# Patient Record
Sex: MALE | Race: WHITE | NOT HISPANIC OR LATINO | Employment: OTHER | URBAN - METROPOLITAN AREA
[De-identification: names, ages, dates, MRNs, and addresses within clinical notes are randomized per-mention and may not be internally consistent; named-entity substitution may affect disease eponyms.]

---

## 2017-04-19 ENCOUNTER — ALLSCRIPTS OFFICE VISIT (OUTPATIENT)
Dept: OTHER | Facility: OTHER | Age: 77
End: 2017-04-19

## 2017-10-19 DIAGNOSIS — N28.89 OTHER SPECIFIED DISORDERS OF KIDNEY AND URETER: ICD-10-CM

## 2017-10-19 DIAGNOSIS — C61 MALIGNANT NEOPLASM OF PROSTATE (HCC): ICD-10-CM

## 2017-10-24 ENCOUNTER — GENERIC CONVERSION - ENCOUNTER (OUTPATIENT)
Dept: OTHER | Facility: OTHER | Age: 77
End: 2017-10-24

## 2017-10-25 ENCOUNTER — GENERIC CONVERSION - ENCOUNTER (OUTPATIENT)
Dept: OTHER | Facility: OTHER | Age: 77
End: 2017-10-25

## 2017-11-16 ENCOUNTER — GENERIC CONVERSION - ENCOUNTER (OUTPATIENT)
Dept: OTHER | Facility: OTHER | Age: 77
End: 2017-11-16

## 2017-12-12 ENCOUNTER — PREPPED CHART (OUTPATIENT)
Dept: URBAN - METROPOLITAN AREA CLINIC 6 | Facility: CLINIC | Age: 77
End: 2017-12-12

## 2018-01-10 NOTE — PROCEDURES
Assessment    1  Prostate cancer (185) (C61)   2  Elevated prostate specific antigen (PSA) (790 93) (R97 2)    Plan  Elevated prostate specific antigen (PSA), Prostate cancer    · (1) TISSUE EXAM; Status:Active - Retrospective By Protocol Authorization; Requested  for:22Jan2016;    Perform:[facility]; Order Comments:CAROLINE akhtar; :[id number]; Last Updated By:Diane Block; 1/22/2016 11:58:11 AM;Ordered;  For:Elevated prostate specific antigen (PSA), Prostate cancer; Ordered By:Sindy Martin; Impression? : C61  Sources(s) : Prostate   · (1) TISSUE EXAM; Status:Active - Retrospective By Protocol Authorization; Requested  for:22Jan2016;    Perform:[facility]; Order Comments:CAROLINE peck; Q:[id number]; Last Updated By:Diane Block; 1/22/2016 11:59:30 AM;Ordered;  For:Elevated prostate specific antigen (PSA), Prostate cancer; Ordered By:Sindy Martin; Impression? : C61  Sources(s) : Prostate   · (1) TISSUE EXAM; Status:Active - Retrospective By Protocol Authorization; Requested  for:22Jan2016;    Perform:[facility]; Order Comments:L pz; TJ:[id number]; Last Updated By:Diane Block; 1/22/2016 12:00:22 PM;Ordered;  For:Elevated prostate specific antigen (PSA), Prostate cancer; Ordered By:Sindy Martin; Impression? : C61  Sources(s) : Prostate   · (1) TISSUE EXAM; Status:Active - Retrospective By Protocol Authorization; Requested  for:22Jan2016;    Perform:[facility]; Order Comments:L cz; UEP:[id number]; Last Updated By:Diane Block; 1/22/2016 12:01:12 PM;Ordered;  For:Elevated prostate specific antigen (PSA), Prostate cancer; Ordered By:Sindy Martin; Impression? : C61  Sources(s) : Prostate    Chief Complaint  Chief Complaint Free Text Note Form: pt presents f/u TRUS BX; cap Screen      Active Problems    1  Elevated prostate specific antigen (PSA) (790 93) (R97 2)   2  Prostate cancer (185) (C61)    Past Medical History    1   History of Diabetes Mellitus (250 00)   2  History of hypertension (V12 59) (Z86 79)    Surgical History    1  History of Needle Biopsy Of Prostate    Family History    1  Family history of Diabetes Mellitus (V18 0)   2  Family history of Heart Disease (V17 49)    Social History    · Being A Social Drinker   · Never A Smoker    Current Meds   1  Aspirin 81 MG Oral Tablet; Therapy: (Recorded:24Apr2015) to Recorded   2  Calcium 600/Vitamin D 600-200 MG-UNIT TABS; Therapy: (Recorded:24Apr2015) to Recorded   3  Ciprodex 0 3-0 1 % Otic Suspension; Therapy: (Myron Mason) to Recorded   4  Crestor 10 MG Oral Tablet Recorded   5  GlipiZIDE XL 2 5 MG Oral Tablet Extended Release 24 Hour Recorded   6  Hytrin 2 MG CAPS; Therapy: (Myron Pee) to Recorded   7  Ramipril 2 5 MG Oral Capsule Recorded   8  Terazosin HCl - 2 MG Oral Capsule Recorded    Allergies    1  Penicillins    Vitals  Vital Signs [Data Includes: Current Encounter]    Recorded: 82RLR4751 01:27PM   Heart Rate 68   Systolic 869   Diastolic 70   Weight 971 lb    BMI Calculated 23 01   BSA Calculated 1 94     Procedure    Prostate Biopsy note: The patient returns to the office today to undergo a transrectal ultrasound-guided biopsy of the prostate secondary to active surveillance Sergio 6 prostate cancer  Risk and benefits of the procedure were discussed  Informed consent was obtained  The patient's prebiopsy preparation was deemed to be adequate  Antibiotics had been taken as prescribed  If appropriate blood thinners had been placed on hold  The patient completed an enema as prescribed  The patient was placed in the lateral decubitus position  Digital rectal examination was performed revealing a 50 gram prostate  Viscous lidocaine jelly was instilled into the rectum  Transrectal ultrasonography was then performed  The prostate measured 55 grams  Central zone calcifications noted  No other abnormality    5 cc of 2% lidocaine were then injected bilaterally between the junction of the base of the prostate and the seminal vesicles  Ultrasound guidance was utilized to place the needle into proper position for the administration of the local anesthetic  A standard 12 core biopsy was then performed  4 cores were taken from the right peripheral zone  2 cores were taken from the right central zone  4 cores were taken from the left peripheral zone  2 cores were taken from the left central zone  Overall the patient tolerated the procedure and there were no complications  Future Appointments    Date/Time Provider Specialty Site   02/04/2016 11:00 AM WM Up  Urology [address] [place] [address]   05/05/2016 10:00 AM WM Up   Urology [address]     Signatures   Electronically signed by : WM Pugh ; Jan 22 2016  2:02PM EST                       (Author)

## 2018-01-13 VITALS
HEIGHT: 71 IN | BODY MASS INDEX: 23.4 KG/M2 | HEART RATE: 76 BPM | DIASTOLIC BLOOD PRESSURE: 80 MMHG | SYSTOLIC BLOOD PRESSURE: 122 MMHG | WEIGHT: 167.13 LBS

## 2018-01-15 NOTE — MISCELLANEOUS
Message   Recorded as Task   Date: 11/16/2017 12:31 PM, Created By: Alessia Allen   Task Name: Care Coordination   Assigned To: [id number]   Regarding Patient: Abdifatah Jalloh, Status: Active   Comment:    Lisette Gastelum - 16 Nov 2017 12:31 PM     TASK CREATED  PT STOPPED IN TO SEE YOU, HE WANTS YOU TO CALL HIM WITH HIS CT RESULTS HE HAD DONE AT [facility] END OF OCT TO BEGINING OF [address] [facility] ON HIS HOME [phone] OR CELL [phone]  Aly Pamelasharon Santiago Maryjo Goode - 12 Nov 2017 2:42 PM     TASK EDITED  CT reviewed with Dr Brigido Brewer, per Dr Brigido Brewer, CT stable, shows the same as previous  Patient aware  Per Dr Brigido Brewer patient is to follow up in 6 months with PSA and CT PTV  Patient to call office to schedule  Active Problems    1  Acute diverticulitis (562 11) (K57 92)   2  Elevated prostate specific antigen (PSA) (790 93) (R97 20)   3  Hematuria (599 70) (R31 9)   4  Prostate cancer (185) (C61)   5  Renal mass (593 9) (N28 89)    Current Meds   1  Aspirin 81 MG TABS; Therapy: (Recorded:10Pdi8079) to Recorded   2  Calcium 600/Vitamin D 600-200 MG-UNIT TABS; Therapy: (Recorded:10Bzt0136) to Recorded   3  Crestor 10 MG Oral Tablet (Rosuvastatin Calcium) Recorded   4  GlipiZIDE XL 2 5 MG Oral Tablet Extended Release 24 Hour Recorded   5  Hytrin 2 MG CAPS (Terazosin HCl); Therapy: (Jeni Matlock) to Recorded   6  Ramipril 2 5 MG Oral Capsule Recorded    Allergies    1   No Known Drug Allergies    Signatures   Electronically signed by : Ian Tineo RN; Nov 16 2017  2:42PM EST                       (Author)

## 2018-01-22 VITALS
HEIGHT: 71 IN | SYSTOLIC BLOOD PRESSURE: 126 MMHG | BODY MASS INDEX: 23.71 KG/M2 | WEIGHT: 169.38 LBS | HEART RATE: 70 BPM | DIASTOLIC BLOOD PRESSURE: 84 MMHG

## 2018-04-02 DIAGNOSIS — N28.89 OTHER SPECIFIED DISORDERS OF KIDNEY AND URETER: ICD-10-CM

## 2018-04-26 ENCOUNTER — TELEPHONE (OUTPATIENT)
Dept: UROLOGY | Facility: CLINIC | Age: 78
End: 2018-04-26

## 2018-04-26 DIAGNOSIS — N28.89 OTHER SPECIFIED DISORDERS OF KIDNEY AND URETER: Primary | ICD-10-CM

## 2018-04-26 NOTE — TELEPHONE ENCOUNTER
Patient of Dr Roz Boxer, seen in [place] office  Received call from [facility] at [address] [facility]  Patient has CT chest abdomen and pelvis with and without contrast scheduled for next week  Patient has not had a recent BMP done  Requesting an order for BMP be faxed to [phone]  Order faxed

## 2018-05-07 ENCOUNTER — OFFICE VISIT (OUTPATIENT)
Dept: UROLOGY | Facility: AMBULATORY SURGERY CENTER | Age: 78
End: 2018-05-07
Payer: MEDICARE

## 2018-05-07 VITALS
DIASTOLIC BLOOD PRESSURE: 82 MMHG | WEIGHT: 167 LBS | HEIGHT: 71 IN | BODY MASS INDEX: 23.38 KG/M2 | HEART RATE: 56 BPM | SYSTOLIC BLOOD PRESSURE: 126 MMHG

## 2018-05-07 DIAGNOSIS — N28.89 RENAL MASS: Primary | ICD-10-CM

## 2018-05-07 DIAGNOSIS — R97.20 ELEVATED PSA: ICD-10-CM

## 2018-05-07 PROCEDURE — 99214 OFFICE O/P EST MOD 30 MIN: CPT | Performed by: UROLOGY

## 2018-05-07 NOTE — LETTER
May 7, 2018     [address], [address] [address]    Patient: Maik Farias   YOB: 1940   Date of Visit: 5/7/2018       Dear Dr Corey Gallagher: Thank you for referring Rosamaria Gomez to me for evaluation  Below are my notes for this consultation  If you have questions, please do not hesitate to call me  I look forward to following your patient along with you  Sincerely,        Nakia Talbert MD        CC: No Recipients  Nakia Talbert MD  5/7/2018  4:37 PM  Sign at close encounter  5/7/2018    Maik Farias  1940  [id number]        Assessment  Solid right renal mass, possibly 2, of the left kidney  Active surveillance prostate cancer    Plan  At this point I recommend MRI to further evaluate the kidney more carefully and determine whether there is risk enough to recommend surgery  He was asked to obtain a disc at the time of his study and bring it so I can evaluate the images  He agrees to do so  Follow-up for further discussion depending on findings  Continue to follow-up PSA periodically for low-grade prostate cancer active surveillance  {kktime:[id number]}    History of Present Illness  Richy Palacios is a 78 y o  male with small renal mass who opted for surveillance, as well as active surveillance prostate cancer  He has mild to moderate urinary symptoms associated with enlarged prostate  This is not terribly bothersome for him  PSA is relatively stable at 4 62 which is within range from the last few years  He had a CT scan done at [facility] system and did not bring a disc  Report indicates 1 stable left renal nodule as well as potentially a second enhancing lesion measuring 2 4 cm  This was not identified from previous reports, and images are not currently available          AUA SYMPTOM SCORE      Most Recent Value   AUA SYMPTOM SCORE   How often have you had a sensation of not emptying your bladder completely after you finished urinating? 0   How often have you had to urinate again less than two hours after you finished urinating? 4   How often have you found you stopped and started again several times when you urinate? 1   How often have you found it difficult to postpone urination? 5   How often have you had a weak urinary stream?  0   How often have you had to push or strain to begin urination? 0   How many times did you most typically get up to urinate from the time you went to bed at night until the time you got up in the morning? 5   Quality of Life: If you were to spend the rest of your life with your urinary condition just the way it is now, how would you feel about that?  3   AUA SYMPTOM SCORE  15          Review of Systems  Review of Systems   Constitutional: Negative  HENT: Negative  Respiratory: Negative  Cardiovascular: Negative  Gastrointestinal: Negative  Genitourinary:        As per HPI   Musculoskeletal: Negative  Skin: Negative  Neurological: Negative  Hematological: Negative  Past Medical History  Past Medical History:   Diagnosis Date    Cancer ([facility] [address] )     PROSTATE RESOLVED    Chronic kidney disease     L MIDPOLE RENAL MASS    Diabetes mellitus (HCC)     Diverticulitis     Elevated cholesterol     Gallstones     History of colon polyps     Hypertension     Stomach pain        Past Social History  Past Surgical History:   Procedure Laterality Date    [place] COLONOSCOPY FLX DX W/COLLJ SPEC WHEN PFRMD N/A 8/19/2016    Procedure: COLONOSCOPY;  Surgeon: Fatou Arriola MD;  [facility]: [facility]; Service: Colorectal    WISDOM TOOTH EXTRACTION         Past Family History  History reviewed  No pertinent family history  Past Social history  Social History     Social History    Marital status: /Civil Union     Spouse name: N/A    Number of children: N/A    Years of education: N/A     Occupational History    Not on file       Social History Main Topics  Smoking status: Former Smoker    Smokeless tobacco: Never Used    Alcohol use Yes      Comment: OCCASION    Drug use: No    Sexual activity: Not on file     Other Topics Concern    Not on file     Social History Narrative    No narrative on file     History   Smoking Status    Former Smoker   Smokeless Tobacco    Never Used       Current Medications  Current Outpatient Prescriptions   Medication Sig Dispense Refill    aspirin 81 MG tablet Take 81 mg by mouth daily   calcium carbonate (OS-HAM) 600 MG tablet Take 600 mg by mouth 2 (two) times a day with meals   Cholecalciferol (VITAMIN D-3 PO) Take 1,000 Units by mouth daily   glipiZIDE-metFORMIN (METAGLIP) 2 5-250 MG per tablet Take 1 tablet by mouth daily   ramipril (ALTACE) 2 5 mg capsule Take 2 5 mg by mouth daily   rosuvastatin (CRESTOR) 10 MG tablet Take 10 mg by mouth daily   terazosin (HYTRIN) 2 mg capsule Take 2 mg by mouth daily at bedtime  No current facility-administered medications for this visit  Allergies  Allergies   Allergen Reactions    Aluminum-Containing Compounds Other (See Comments)     Lips Swelling    Penicillins        Past Medical History, Social History, Family History, medications and allergies were reviewed  Vitals  Vitals:    05/07/18 1354   BP: 126/82   BP Location: Left arm   Patient Position: Sitting   Cuff Size: Adult   Pulse: 56   Weight: 75 8 kg (167 lb)   Height: 5' 11" (1 803 m)       Physical Exam  Physical Exam   Constitutional: He is oriented to person, place, and time  He appears well-developed and well-nourished  Cardiovascular: Normal rate  Pulmonary/Chest: Effort normal    Abdominal: Soft  Genitourinary:   Genitourinary Comments: About 50 g, smooth   Musculoskeletal: Normal range of motion  Neurological: He is alert and oriented to person, place, and time  Skin: Skin is warm, dry and intact  Psychiatric: He has a normal mood and affect     Vitals reviewed          Results  No results found for: PSA  Lab Results   Component Value Date    GLUCOSE 107 06/15/2016    CALCIUM 9 7 06/15/2016     06/15/2016    K 4 0 06/15/2016    CO2 28 06/15/2016     06/15/2016    BUN 24 06/15/2016    CREATININE 0 98 06/15/2016     No results found for: WBC, HGB, HCT, MCV, PLT

## 2018-05-07 NOTE — PROGRESS NOTES
5/7/2018    Rolando Mendoza  1940  [id number]        Assessment  Solid right renal mass, possibly 2, of the left kidney  Active surveillance prostate cancer    Plan  At this point I recommend MRI to further evaluate the kidney more carefully and determine whether there is risk enough to recommend surgery  He was asked to obtain a disc at the time of his study and bring it so I can evaluate the images  He agrees to do so  Follow-up for further discussion depending on findings  Continue to follow-up PSA periodically for low-grade prostate cancer active surveillance  Total visit time was 25 minutes of which over 50% was spent on counseling  History of Present Illness  Patricia Ayala is a 78 y o  male with small renal mass who opted for surveillance, as well as active surveillance prostate cancer  He has mild to moderate urinary symptoms associated with enlarged prostate  This is not terribly bothersome for him  PSA is relatively stable at 4 62 which is within range from the last few years  He had a CT scan done at [facility] ([place], [place]) and did not bring a disc  Report indicates 1 stable left renal nodule as well as potentially a second enhancing lesion measuring 2 4 cm  This was not identified from previous reports, and images are not currently available  AUA SYMPTOM SCORE      Most Recent Value   AUA SYMPTOM SCORE   How often have you had a sensation of not emptying your bladder completely after you finished urinating? 0   How often have you had to urinate again less than two hours after you finished urinating? 4   How often have you found you stopped and started again several times when you urinate? 1   How often have you found it difficult to postpone urination? 5   How often have you had a weak urinary stream?  0   How often have you had to push or strain to begin urination?   0   How many times did you most typically get up to urinate from the time you went to bed at night until the time you got up in the morning? 5   Quality of Life: If you were to spend the rest of your life with your urinary condition just the way it is now, how would you feel about that?  3   AUA SYMPTOM SCORE  15          Review of Systems  Review of Systems   Constitutional: Negative  HENT: Negative  Respiratory: Negative  Cardiovascular: Negative  Gastrointestinal: Negative  Genitourinary:        As per HPI   Musculoskeletal: Negative  Skin: Negative  Neurological: Negative  Hematological: Negative  Past Medical History  Past Medical History:   Diagnosis Date    Cancer ([address] )     PROSTATE RESOLVED    Chronic kidney disease     L MIDPOLE RENAL MASS    Diabetes mellitus (HCC)     Diverticulitis     Elevated cholesterol     Gallstones     History of colon polyps     Hypertension     Stomach pain        Past Social History  Past Surgical History:   Procedure Laterality Date    [place] COLONOSCOPY FLX DX W/COLLJ SPEC WHEN PFRMD N/A 8/19/2016    Procedure: COLONOSCOPY;  Surgeon: Astrid Myers MD;  [facility]: [facility]; Service: Colorectal    WISDOM TOOTH EXTRACTION         Past Family History  History reviewed  No pertinent family history  Past Social history  Social History     Social History    Marital status: /Civil Union     Spouse name: N/A    Number of children: N/A    Years of education: N/A     Occupational History    Not on file  Social History Main Topics    Smoking status: Former Smoker    Smokeless tobacco: Never Used    Alcohol use Yes      Comment: OCCASION    Drug use: No    Sexual activity: Not on file     Other Topics Concern    Not on file     Social History Narrative    No narrative on file     History   Smoking Status    Former Smoker   Smokeless Tobacco    Never Used       Current Medications  Current Outpatient Prescriptions   Medication Sig Dispense Refill    aspirin 81 MG tablet Take 81 mg by mouth daily        calcium carbonate (OS-HAM) 600 MG tablet Take 600 mg by mouth 2 (two) times a day with meals   Cholecalciferol (VITAMIN D-3 PO) Take 1,000 Units by mouth daily   glipiZIDE-metFORMIN (METAGLIP) 2 5-250 MG per tablet Take 1 tablet by mouth daily   ramipril (ALTACE) 2 5 mg capsule Take 2 5 mg by mouth daily   rosuvastatin (CRESTOR) 10 MG tablet Take 10 mg by mouth daily   terazosin (HYTRIN) 2 mg capsule Take 2 mg by mouth daily at bedtime  No current facility-administered medications for this visit  Allergies  Allergies   Allergen Reactions    Aluminum-Containing Compounds Other (See Comments)     Lips Swelling    Penicillins        Past Medical History, Social History, Family History, medications and allergies were reviewed  Vitals  Vitals:    05/07/18 1354   BP: 126/82   BP Location: Left arm   Patient Position: Sitting   Cuff Size: Adult   Pulse: 56   Weight: 75 8 kg (167 lb)   Height: 5' 11" (1 803 m)       Physical Exam  Physical Exam   Constitutional: He is oriented to person, place, and time  He appears well-developed and well-nourished  Cardiovascular: Normal rate  Pulmonary/Chest: Effort normal    Abdominal: Soft  Genitourinary:   Genitourinary Comments: About 50 g, smooth   Musculoskeletal: Normal range of motion  Neurological: He is alert and oriented to person, place, and time  Skin: Skin is warm, dry and intact  Psychiatric: He has a normal mood and affect  Vitals reviewed          Results  No results found for: PSA  Lab Results   Component Value Date    GLUCOSE 107 06/15/2016    CALCIUM 9 7 06/15/2016     06/15/2016    K 4 0 06/15/2016    CO2 28 06/15/2016     06/15/2016    BUN 24 06/15/2016    CREATININE 0 98 06/15/2016     No results found for: WBC, HGB, HCT, MCV, PLT

## 2018-05-07 NOTE — LETTER
May 7, 2018     Jose Bowser, [address] [address]    Patient: Matthew Lynn   YOB: 1940   Date of Visit: 5/7/2018       Dear Dr Eloisa Cueva: Thank you for referring Alondra Alcocer to me for evaluation  Below are my notes for this consultation  If you have questions, please do not hesitate to call me  I look forward to following your patient along with you  Sincerely,        Azul Cleaning MD        CC: No Recipients  Azul Cleaning MD  5/7/2018  4:37 PM  Sign at close encounter  5/7/2018    Matthew Lynn  1940  [id number]        Assessment  Solid right renal mass, possibly 2, of the left kidney  Active surveillance prostate cancer    Plan  At this point I recommend MRI to further evaluate the kidney more carefully and determine whether there is risk enough to recommend surgery  He was asked to obtain a disc at the time of his study and bring it so I can evaluate the images  He agrees to do so  Follow-up for further discussion depending on findings  Continue to follow-up PSA periodically for low-grade prostate cancer active surveillance  Total visit time was 25 minutes of which over 50% was spent on counseling  History of Present Illness  Marielos Low is a 78 y o  male with small renal mass who opted for surveillance, as well as active surveillance prostate cancer  He has mild to moderate urinary symptoms associated with enlarged prostate  This is not terribly bothersome for him  PSA is relatively stable at 4 62 which is within range from the last few years  He had a CT scan done at [facility] and did not bring a disc  Report indicates 1 stable left renal nodule as well as potentially a second enhancing lesion measuring 2 4 cm  This was not identified from previous reports, and images are not currently available          AUA SYMPTOM SCORE      Most Recent Value   AUA SYMPTOM SCORE   How often have you had a sensation of not emptying your bladder completely after you finished urinating? 0   How often have you had to urinate again less than two hours after you finished urinating? 4   How often have you found you stopped and started again several times when you urinate? 1   How often have you found it difficult to postpone urination? 5   How often have you had a weak urinary stream?  0   How often have you had to push or strain to begin urination? 0   How many times did you most typically get up to urinate from the time you went to bed at night until the time you got up in the morning? 5   Quality of Life: If you were to spend the rest of your life with your urinary condition just the way it is now, how would you feel about that?  3   AUA SYMPTOM SCORE  15          Review of Systems  Review of Systems   Constitutional: Negative  HENT: Negative  Respiratory: Negative  Cardiovascular: Negative  Gastrointestinal: Negative  Genitourinary:        As per HPI   Musculoskeletal: Negative  Skin: Negative  Neurological: Negative  Hematological: Negative  Past Medical History  Past Medical History:   Diagnosis Date    Cancer ([facility] [address] )     PROSTATE RESOLVED    Chronic kidney disease     L MIDPOLE RENAL MASS    Diabetes mellitus (HCC)     Diverticulitis     Elevated cholesterol     Gallstones     History of colon polyps     Hypertension     Stomach pain        Past Social History  Past Surgical History:   Procedure Laterality Date    [place] COLONOSCOPY FLX DX W/COLLJ SPEC WHEN PFRMD N/A 8/19/2016    Procedure: COLONOSCOPY;  Surgeon: Amanuel Mcnair MD;  [facility]; Service: Colorectal    WISDOM TOOTH EXTRACTION         Past Family History  History reviewed  No pertinent family history      Past Social history  Social History     Social History    Marital status: /Civil Union     Spouse name: N/A    Number of children: N/A    Years of education: N/A Occupational History    Not on file  Social History Main Topics    Smoking status: Former Smoker    Smokeless tobacco: Never Used    Alcohol use Yes      Comment: OCCASION    Drug use: No    Sexual activity: Not on file     Other Topics Concern    Not on file     Social History Narrative    No narrative on file     History   Smoking Status    Former Smoker   Smokeless Tobacco    Never Used       Current Medications  Current Outpatient Prescriptions   Medication Sig Dispense Refill    aspirin 81 MG tablet Take 81 mg by mouth daily   calcium carbonate (OS-HAM) 600 MG tablet Take 600 mg by mouth 2 (two) times a day with meals   Cholecalciferol (VITAMIN D-3 PO) Take 1,000 Units by mouth daily   glipiZIDE-metFORMIN (METAGLIP) 2 5-250 MG per tablet Take 1 tablet by mouth daily   ramipril (ALTACE) 2 5 mg capsule Take 2 5 mg by mouth daily   rosuvastatin (CRESTOR) 10 MG tablet Take 10 mg by mouth daily   terazosin (HYTRIN) 2 mg capsule Take 2 mg by mouth daily at bedtime  No current facility-administered medications for this visit  Allergies  Allergies   Allergen Reactions    Aluminum-Containing Compounds Other (See Comments)     Lips Swelling    Penicillins        Past Medical History, Social History, Family History, medications and allergies were reviewed  Vitals  Vitals:    05/07/18 1354   BP: 126/82   BP Location: Left arm   Patient Position: Sitting   Cuff Size: Adult   Pulse: 56   Weight: 75 8 kg (167 lb)   Height: 5' 11" (1 803 m)       Physical Exam  Physical Exam   Constitutional: He is oriented to person, place, and time  He appears well-developed and well-nourished  Cardiovascular: Normal rate  Pulmonary/Chest: Effort normal    Abdominal: Soft  Genitourinary:   Genitourinary Comments: About 50 g, smooth   Musculoskeletal: Normal range of motion  Neurological: He is alert and oriented to person, place, and time     Skin: Skin is warm, dry and intact  Psychiatric: He has a normal mood and affect  Vitals reviewed          Results  No results found for: PSA  Lab Results   Component Value Date    GLUCOSE 107 06/15/2016    CALCIUM 9 7 06/15/2016     06/15/2016    K 4 0 06/15/2016    CO2 28 06/15/2016     06/15/2016    BUN 24 06/15/2016    CREATININE 0 98 06/15/2016     No results found for: WBC, HGB, HCT, MCV, PLT

## 2018-05-17 ENCOUNTER — TELEPHONE (OUTPATIENT)
Dept: UROLOGY | Facility: CLINIC | Age: 78
End: 2018-05-17

## 2018-05-17 NOTE — TELEPHONE ENCOUNTER
Can we move his appointment sooner into a discussion spot? He had his MRI done and will get the disc and bring it

## 2018-05-17 NOTE — TELEPHONE ENCOUNTER
Left message on both home and cell numbers to reschedule patient's appointment   Looking to move patient's appointment to next Wednesday 5/23/18 at 11:30

## 2018-05-23 ENCOUNTER — OFFICE VISIT (OUTPATIENT)
Dept: UROLOGY | Facility: AMBULATORY SURGERY CENTER | Age: 78
End: 2018-05-23
Payer: MEDICARE

## 2018-05-23 VITALS
DIASTOLIC BLOOD PRESSURE: 80 MMHG | SYSTOLIC BLOOD PRESSURE: 128 MMHG | HEART RATE: 80 BPM | BODY MASS INDEX: 23.1 KG/M2 | WEIGHT: 165 LBS | HEIGHT: 71 IN

## 2018-05-23 DIAGNOSIS — N28.89 RENAL MASS: ICD-10-CM

## 2018-05-23 DIAGNOSIS — C61 PROSTATE CANCER (HCC): Primary | ICD-10-CM

## 2018-05-23 PROCEDURE — 99214 OFFICE O/P EST MOD 30 MIN: CPT | Performed by: UROLOGY

## 2018-05-23 NOTE — PROGRESS NOTES
5/23/2018    Debra Ellsworth  1940  [id number]        Assessment  Left renal mass   Active surveillance prostate cancer      Plan  I had a long discussion today with the patient and his wife  They are concerned about the CT and MRI findings  We reviewed the images of the studies and also the written radiology results from [facility] everywhere  We discussed the likelihood that this is a solid renal mass consistent with carcinoma at about 90%  We discussed the possibility of observation, percutaneous biopsy, percutaneous cryoablation, and robotic partial nephrectomy to remove the mass  The each of these options was discussed in detail  We discussed the technique, risks, and benefits of each at length  They had numerous questions which I was happy to answer for them  They would like to discuss with the family members and would likely wish to pursue observation  For now he will have an appointment to follow up in 6 months with PSA  If he wishes to continue with observation, I would check another CT chest, abdomen and pelvis in 1 year from now, as there has not been significant change over the past 2 years  History of Present Illness  Michele Duvall is a 78 y o  male with small left renal mass under surveillance for the past 2 years  He has no urinary symptoms  Active surveillance prostate cancer ongoing, with most recent PSA relatively stable at 4 62  It has been stable for greater than 1 year, although prior to that, PSA was in the lower range and stable  AUA SYMPTOM SCORE      Most Recent Value   AUA SYMPTOM SCORE   How often have you had a sensation of not emptying your bladder completely after you finished urinating? 5   How often have you had to urinate again less than two hours after you finished urinating? 4   How often have you found you stopped and started again several times when you urinate? 4   How often have you found it difficult to postpone urination?   5   How often have you had a weak urinary stream?  0   How often have you had to push or strain to begin urination? 0   How many times did you most typically get up to urinate from the time you went to bed at night until the time you got up in the morning? 2   Quality of Life: If you were to spend the rest of your life with your urinary condition just the way it is now, how would you feel about that?  5   AUA SYMPTOM SCORE  20          Review of Systems  Review of Systems   Constitutional: Negative  HENT: Negative  Respiratory: Negative  Cardiovascular: Negative  Gastrointestinal: Negative  Genitourinary:        As per HPI   Musculoskeletal: Negative  Skin: Negative  Neurological: Negative  Hematological: Negative  Past Medical History  Past Medical History:   Diagnosis Date    Cancer ([address] )     PROSTATE RESOLVED    Chronic kidney disease     L MIDPOLE RENAL MASS    Diabetes mellitus (HCC)     Diverticulitis     Elevated cholesterol     Gallstones     History of colon polyps     Hypertension     Stomach pain        Past Social History  Past Surgical History:   Procedure Laterality Date    [place] COLONOSCOPY FLX DX W/COLLJ SPEC WHEN PFRMD N/A 8/19/2016    Procedure: COLONOSCOPY;  Surgeon: Sheridan Berman MD;  [facility]; Service: Colorectal    WISDOM TOOTH EXTRACTION         Past Family History  Family History   Problem Relation Age of Onset    No Known Problems Father     No Known Problems Mother        Past Social history  Social History     Social History    Marital status: /Civil Union     Spouse name: N/A    Number of children: N/A    Years of education: N/A     Occupational History    Not on file       Social History Main Topics    Smoking status: Former Smoker    Smokeless tobacco: Never Used    Alcohol use Yes      Comment: Social    Drug use: No    Sexual activity: Not on file     Other Topics Concern    Not on file     Social History Narrative    No narrative on file     History   Smoking Status    Former Smoker   Smokeless Tobacco    Never Used       Current Medications  Current Outpatient Prescriptions   Medication Sig Dispense Refill    aspirin 81 MG tablet Take 81 mg by mouth daily   calcium carbonate (OS-HAM) 600 MG tablet Take 600 mg by mouth 2 (two) times a day with meals   Cholecalciferol (VITAMIN D-3 PO) Take 1,000 Units by mouth daily   glipiZIDE-metFORMIN (METAGLIP) 2 5-250 MG per tablet Take 1 tablet by mouth daily   ramipril (ALTACE) 2 5 mg capsule Take 2 5 mg by mouth daily   rosuvastatin (CRESTOR) 10 MG tablet Take 10 mg by mouth daily   terazosin (HYTRIN) 2 mg capsule Take 2 mg by mouth daily at bedtime  No current facility-administered medications for this visit  Allergies  Allergies   Allergen Reactions    Aluminum-Containing Compounds Other (See Comments)     Lips Swelling    Penicillins        Past Medical History, Social History, Family History, medications and allergies were reviewed  Vitals  Vitals:    05/23/18 1128   BP: 128/80   BP Location: Left arm   Patient Position: Sitting   Cuff Size: Adult   Pulse: 80   Weight: 74 8 kg (165 lb)   Height: 5' 10 5" (1 791 m)       Physical Exam  Physical Exam   Constitutional: He is oriented to person, place, and time  He appears well-developed and well-nourished  Cardiovascular: Normal rate  Pulmonary/Chest: Effort normal    Abdominal: Soft  Musculoskeletal: Normal range of motion  Neurological: He is alert and oriented to person, place, and time  Skin: Skin is warm, dry and intact  Psychiatric: He has a normal mood and affect  Vitals reviewed          Results  No results found for: PSA  Lab Results   Component Value Date    GLUCOSE 107 06/15/2016    CALCIUM 9 7 06/15/2016     06/15/2016    K 4 0 06/15/2016    CO2 28 06/15/2016     06/15/2016    BUN 24 06/15/2016    CREATININE 0 98 06/15/2016     No results found for: WBC, HGB, HCT, MCV, PLT

## 2018-05-23 NOTE — LETTER
May 23, 2018     Melina Herrera, [address] [facility] [address]    Patient: Marcia Pittman   YOB: 1940   Date of Visit: 5/23/2018       Dear Dr Chanda De: Thank you for referring Claudio Sanchez to me for evaluation  Below are my notes for this consultation  If you have questions, please do not hesitate to call me  I look forward to following your patient along with you  Sincerely,        Joann Galarza MD        CC: No Recipients  Joann Galarza MD  5/23/2018  1:07 PM  Sign at close encounter  5/23/2018    Marcia Pittman  1940  [id number]        Assessment  Left renal mass   Active surveillance prostate cancer      Plan  I had a long discussion today with the patient and his wife  They are concerned about the CT and MRI findings  We reviewed the images of the studies and also the written radiology results from [facility] everywhere  We discussed the likelihood that this is a solid renal mass consistent with carcinoma at about 90%  We discussed the possibility of observation, percutaneous biopsy, percutaneous cryoablation, and robotic partial nephrectomy to remove the mass  The each of these options was discussed in detail  We discussed the technique, risks, and benefits of each at length  They had numerous questions which I was happy to answer for them  They would like to discuss with the family members and would likely wish to pursue observation  For now he will have an appointment to follow up in 6 months with PSA  If he wishes to continue with observation, I would check another CT chest, abdomen and pelvis in 1 year from now, as there has not been significant change over the past 2 years  History of Present Illness  Marcia Montaño is a 78 y o  male with small left renal mass under surveillance for the past 2 years  He has no urinary symptoms    Active surveillance prostate cancer ongoing, with most recent PSA relatively stable at 4 62   It has been stable for greater than 1 year, although prior to that, PSA was in the lower range and stable  AUA SYMPTOM SCORE      Most Recent Value   AUA SYMPTOM SCORE   How often have you had a sensation of not emptying your bladder completely after you finished urinating? 5   How often have you had to urinate again less than two hours after you finished urinating? 4   How often have you found you stopped and started again several times when you urinate? 4   How often have you found it difficult to postpone urination? 5   How often have you had a weak urinary stream?  0   How often have you had to push or strain to begin urination? 0   How many times did you most typically get up to urinate from the time you went to bed at night until the time you got up in the morning? 2   Quality of Life: If you were to spend the rest of your life with your urinary condition just the way it is now, how would you feel about that?  5   AUA SYMPTOM SCORE  20          Review of Systems  Review of Systems   Constitutional: Negative  HENT: Negative  Respiratory: Negative  Cardiovascular: Negative  Gastrointestinal: Negative  Genitourinary:        As per HPI   Musculoskeletal: Negative  Skin: Negative  Neurological: Negative  Hematological: Negative  Past Medical History  Past Medical History:   Diagnosis Date    Cancer ([facility] [address] )     PROSTATE RESOLVED    Chronic kidney disease     L MIDPOLE RENAL MASS    Diabetes mellitus (HCC)     Diverticulitis     Elevated cholesterol     Gallstones     History of colon polyps     Hypertension     Stomach pain        Past Social History  Past Surgical History:   Procedure Laterality Date    [place] COLONOSCOPY FLX DX W/COLLJ SPEC WHEN PFRMD N/A 8/19/2016    Procedure: COLONOSCOPY;  Surgeon: Daniel Teague MD;  [facility];   Service: Colorectal    WISDOM TOOTH EXTRACTION         Past Family History  Family History   Problem Relation Age of Onset    No Known Problems Father     No Known Problems Mother        Past Social history  Social History     Social History    Marital status: /Civil Union     Spouse name: N/A    Number of children: N/A    Years of education: N/A     Occupational History    Not on file  Social History Main Topics    Smoking status: Former Smoker    Smokeless tobacco: Never Used    Alcohol use Yes      Comment: Social    Drug use: No    Sexual activity: Not on file     Other Topics Concern    Not on file     Social History Narrative    No narrative on file     History   Smoking Status    Former Smoker   Smokeless Tobacco    Never Used       Current Medications  Current Outpatient Prescriptions   Medication Sig Dispense Refill    aspirin 81 MG tablet Take 81 mg by mouth daily   calcium carbonate (OS-HAM) 600 MG tablet Take 600 mg by mouth 2 (two) times a day with meals   Cholecalciferol (VITAMIN D-3 PO) Take 1,000 Units by mouth daily   glipiZIDE-metFORMIN (METAGLIP) 2 5-250 MG per tablet Take 1 tablet by mouth daily   ramipril (ALTACE) 2 5 mg capsule Take 2 5 mg by mouth daily   rosuvastatin (CRESTOR) 10 MG tablet Take 10 mg by mouth daily   terazosin (HYTRIN) 2 mg capsule Take 2 mg by mouth daily at bedtime  No current facility-administered medications for this visit  Allergies  Allergies   Allergen Reactions    Aluminum-Containing Compounds Other (See Comments)     Lips Swelling    Penicillins        Past Medical History, Social History, Family History, medications and allergies were reviewed  Vitals  Vitals:    05/23/18 1128   BP: 128/80   BP Location: Left arm   Patient Position: Sitting   Cuff Size: Adult   Pulse: 80   Weight: 74 8 kg (165 lb)   Height: 5' 10 5" (1 791 m)       Physical Exam  Physical Exam   Constitutional: He is oriented to person, place, and time  He appears well-developed and well-nourished  Cardiovascular: Normal rate  Pulmonary/Chest: Effort normal    Abdominal: Soft  Musculoskeletal: Normal range of motion  Neurological: He is alert and oriented to person, place, and time  Skin: Skin is warm, dry and intact  Psychiatric: He has a normal mood and affect  Vitals reviewed          Results  No results found for: PSA  Lab Results   Component Value Date    GLUCOSE 107 06/15/2016    CALCIUM 9 7 06/15/2016     06/15/2016    K 4 0 06/15/2016    CO2 28 06/15/2016     06/15/2016    BUN 24 06/15/2016    CREATININE 0 98 06/15/2016     No results found for: WBC, HGB, HCT, MCV, PLT

## 2018-06-01 LAB — HBA1C MFR BLD HPLC: 7.5 %

## 2018-11-13 ENCOUNTER — OFFICE VISIT (OUTPATIENT)
Dept: UROLOGY | Facility: AMBULATORY SURGERY CENTER | Age: 78
End: 2018-11-13
Payer: MEDICARE

## 2018-11-13 VITALS
DIASTOLIC BLOOD PRESSURE: 82 MMHG | HEART RATE: 84 BPM | WEIGHT: 166.2 LBS | BODY MASS INDEX: 23.27 KG/M2 | HEIGHT: 71 IN | SYSTOLIC BLOOD PRESSURE: 140 MMHG

## 2018-11-13 DIAGNOSIS — N28.89 RENAL MASS: ICD-10-CM

## 2018-11-13 DIAGNOSIS — C61 PROSTATE CANCER (HCC): Primary | ICD-10-CM

## 2018-11-13 PROCEDURE — 99214 OFFICE O/P EST MOD 30 MIN: CPT | Performed by: UROLOGY

## 2018-11-13 NOTE — PROGRESS NOTES
11/13/2018    Netta Holguin  1940  [id number]        Assessment  Active surveillance [address] prostate cancer  Small renal mass, stable    Plan  We discussed all of his issues in detail  He had many questions  Will follow up in 6 months with another PSA as well as metabolic panel and CT scan to evaluate his kidney mass  No evidence of inguinal hernia on my examination  If it remains bothersome, he may seek out a surgical opinion  All questions answered to their satisfaction  Total visit time was 25 minutes of which over 50% was spent on counseling  History of Present Illness  Asa Lopez is a 70 y o  male with history of prostate cancer undergoing active surveillance  He also has history of left renal mass, for which surveillance is also being performed  It has been stable for 2 years  His only complaint is related to some occasional left groin discomfort  He has no urinary symptoms  Recent PSA 4 60, which is stable for him  AUA SYMPTOM SCORE      Most Recent Value   AUA SYMPTOM SCORE   How often have you had a sensation of not emptying your bladder completely after you finished urinating? 1   How often have you had to urinate again less than two hours after you finished urinating? 1   How often have you found you stopped and started again several times when you urinate? 1   How often have you found it difficult to postpone urination? 0   How often have you had a weak urinary stream?  1   How often have you had to push or strain to begin urination? 0   How many times did you most typically get up to urinate from the time you went to bed at night until the time you got up in the morning? 2   Quality of Life: If you were to spend the rest of your life with your urinary condition just the way it is now, how would you feel about that?  2   AUA SYMPTOM SCORE  6          Review of Systems  Review of Systems   Constitutional: Negative  HENT: Negative  Respiratory: Negative  Cardiovascular: Negative  Gastrointestinal: Negative  Genitourinary:        As per HPI   Musculoskeletal: Negative  Skin: Negative  Neurological: Negative  Hematological: Negative  Past Medical History  Past Medical History:   Diagnosis Date    Cancer ([address] )     PROSTATE RESOLVED    Chronic kidney disease     L MIDPOLE RENAL MASS    Diabetes mellitus (HCC)     Diverticulitis     Elevated cholesterol     Gallstones     History of colon polyps     Hypertension     Stomach pain        Past Social History  Past Surgical History:   Procedure Laterality Date    [place] COLONOSCOPY FLX DX W/COLLJ SPEC WHEN PFRMD N/A 8/19/2016    Procedure: COLONOSCOPY;  Surgeon: Mamta Pozo MD;  [facility]; Service: Colorectal    WISDOM TOOTH EXTRACTION         Past Family History  Family History   Problem Relation Age of Onset    No Known Problems Father     No Known Problems Mother        Past Social history  Social History     Social History    Marital status: /Civil Union     Spouse name: N/A    Number of children: N/A    Years of education: N/A     Occupational History    Not on file  Social History Main Topics    Smoking status: Former Smoker    Smokeless tobacco: Never Used    Alcohol use Yes      Comment: Social    Drug use: No    Sexual activity: Not on file     Other Topics Concern    Not on file     Social History Narrative    No narrative on file     History   Smoking Status    Former Smoker   Smokeless Tobacco    Never Used       Current Medications  Current Outpatient Prescriptions   Medication Sig Dispense Refill    aspirin 81 MG tablet Take 81 mg by mouth daily   calcium carbonate (OS-HAM) 600 MG tablet Take 600 mg by mouth 2 (two) times a day with meals   Cholecalciferol (VITAMIN D-3 PO) Take 1,000 Units by mouth daily   glipiZIDE-metFORMIN (METAGLIP) 2 5-250 MG per tablet Take 1 tablet by mouth daily        ramipril (ALTACE) 2 5 mg capsule Take 2 5 mg by mouth daily   rosuvastatin (CRESTOR) 10 MG tablet Take 10 mg by mouth daily   terazosin (HYTRIN) 2 mg capsule Take 2 mg by mouth daily at bedtime  No current facility-administered medications for this visit  Allergies  Allergies   Allergen Reactions    Aluminum-Containing Compounds Other (See Comments)     Lips Swelling    Penicillins        Past Medical History, Social History, Family History, medications and allergies were reviewed  Vitals  Vitals:    11/13/18 1039   BP: 140/82   BP Location: Left arm   Patient Position: Sitting   Cuff Size: Adult   Pulse: 84   Weight: 75 4 kg (166 lb 3 2 oz)   Height: 5' 11" (1 803 m)       Physical Exam  Physical Exam   Constitutional: He is oriented to person, place, and time  He appears well-developed and well-nourished  Cardiovascular: Normal rate  Pulmonary/Chest: Effort normal    Abdominal: Soft  Genitourinary:   Genitourinary Comments: Normal external genitalia, no left inguinal hernia appreciated  Musculoskeletal: Normal range of motion  Neurological: He is alert and oriented to person, place, and time  Skin: Skin is warm, dry and intact  Psychiatric: He has a normal mood and affect  Vitals reviewed          Results  No results found for: PSA  Lab Results   Component Value Date    CALCIUM 9 7 06/15/2016    K 4 0 06/15/2016    CO2 28 06/15/2016     06/15/2016    BUN 24 06/15/2016    CREATININE 0 98 06/15/2016     No results found for: WBC, HGB, HCT, MCV, PLT

## 2018-11-13 NOTE — LETTER
November 13, 2018     Caryn Henning, [address] [address]    Patient: Cherylene Sciara   YOB: 1940   Date of Visit: 11/13/2018       Dear Dr Nurys Urbano: Thank you for referring Francia Hewitt to me for evaluation  Below are my notes for this consultation  If you have questions, please do not hesitate to call me  I look forward to following your patient along with you  Sincerely,        Jeni Bermudez MD        CC: No Recipients  Jeni Bermudez MD  11/13/2018 12:09 PM  Sign at close encounter  11/13/2018    Cherylene Sciara  1940  [id number]        Assessment  Active surveillance [place] 6 prostate cancer  Small renal mass, stable    Plan  We discussed all of his issues in detail  He had many questions  Will follow up in 6 months with another PSA as well as metabolic panel and CT scan to evaluate his kidney mass  No evidence of inguinal hernia on my examination  If it remains bothersome, he may seek out a surgical opinion  All questions answered to their satisfaction  Total visit time was 25 minutes of which over 50% was spent on counseling  History of Present Illness  Reinaldo Rodriguez is a 70 y o  male with history of prostate cancer undergoing active surveillance  He also has history of left renal mass, for which surveillance is also being performed  It has been stable for 2 years  His only complaint is related to some occasional left groin discomfort  He has no urinary symptoms  Recent PSA 4 60, which is stable for him  AUA SYMPTOM SCORE      Most Recent Value   AUA SYMPTOM SCORE   How often have you had a sensation of not emptying your bladder completely after you finished urinating? 1   How often have you had to urinate again less than two hours after you finished urinating? 1   How often have you found you stopped and started again several times when you urinate?   1   How often have you found it difficult to postpone urination? 0   How often have you had a weak urinary stream?  1   How often have you had to push or strain to begin urination? 0   How many times did you most typically get up to urinate from the time you went to bed at night until the time you got up in the morning? 2   Quality of Life: If you were to spend the rest of your life with your urinary condition just the way it is now, how would you feel about that?  2   AUA SYMPTOM SCORE  6          Review of Systems  Review of Systems   Constitutional: Negative  HENT: Negative  Respiratory: Negative  Cardiovascular: Negative  Gastrointestinal: Negative  Genitourinary:        As per HPI   Musculoskeletal: Negative  Skin: Negative  Neurological: Negative  Hematological: Negative  Past Medical History  Past Medical History:   Diagnosis Date    Cancer ([facility] [address] )     PROSTATE RESOLVED    Chronic kidney disease     L MIDPOLE RENAL MASS    Diabetes mellitus (HCC)     Diverticulitis     Elevated cholesterol     Gallstones     History of colon polyps     Hypertension     Stomach pain        Past Social History  Past Surgical History:   Procedure Laterality Date    [place] COLONOSCOPY FLX DX W/COLLJ SPEC WHEN PFRMD N/A 8/19/2016    Procedure: COLONOSCOPY;  Surgeon: Gayle Wild MD;  [facility]; Service: Colorectal    WISDOM TOOTH EXTRACTION         Past Family History  Family History   Problem Relation Age of Onset    No Known Problems Father     No Known Problems Mother        Past Social history  Social History     Social History    Marital status: /Civil Union     Spouse name: N/A    Number of children: N/A    Years of education: N/A     Occupational History    Not on file       Social History Main Topics    Smoking status: Former Smoker    Smokeless tobacco: Never Used    Alcohol use Yes      Comment: Social    Drug use: No    Sexual activity: Not on file     Other Topics Concern    Not on file Social History Narrative    No narrative on file     History   Smoking Status    Former Smoker   Smokeless Tobacco    Never Used       Current Medications  Current Outpatient Prescriptions   Medication Sig Dispense Refill    aspirin 81 MG tablet Take 81 mg by mouth daily   calcium carbonate (OS-HAM) 600 MG tablet Take 600 mg by mouth 2 (two) times a day with meals   Cholecalciferol (VITAMIN D-3 PO) Take 1,000 Units by mouth daily   glipiZIDE-metFORMIN (METAGLIP) 2 5-250 MG per tablet Take 1 tablet by mouth daily   ramipril (ALTACE) 2 5 mg capsule Take 2 5 mg by mouth daily   rosuvastatin (CRESTOR) 10 MG tablet Take 10 mg by mouth daily   terazosin (HYTRIN) 2 mg capsule Take 2 mg by mouth daily at bedtime  No current facility-administered medications for this visit  Allergies  Allergies   Allergen Reactions    Aluminum-Containing Compounds Other (See Comments)     Lips Swelling    Penicillins        Past Medical History, Social History, Family History, medications and allergies were reviewed  Vitals  Vitals:    11/13/18 1039   BP: 140/82   BP Location: Left arm   Patient Position: Sitting   Cuff Size: Adult   Pulse: 84   Weight: 75 4 kg (166 lb 3 2 oz)   Height: 5' 11" (1 803 m)       Physical Exam  Physical Exam   Constitutional: He is oriented to person, place, and time  He appears well-developed and well-nourished  Cardiovascular: Normal rate  Pulmonary/Chest: Effort normal    Abdominal: Soft  Genitourinary:   Genitourinary Comments: Normal external genitalia, no left inguinal hernia appreciated  Musculoskeletal: Normal range of motion  Neurological: He is alert and oriented to person, place, and time  Skin: Skin is warm, dry and intact  Psychiatric: He has a normal mood and affect  Vitals reviewed          Results  No results found for: PSA  Lab Results   Component Value Date    CALCIUM 9 7 06/15/2016    K 4 0 06/15/2016    CO2 28 06/15/2016  06/15/2016    BUN 24 06/15/2016    CREATININE 0 98 06/15/2016     No results found for: WBC, HGB, HCT, MCV, PLT

## 2019-01-09 ENCOUNTER — TELEPHONE (OUTPATIENT)
Dept: UROLOGY | Facility: AMBULATORY SURGERY CENTER | Age: 79
End: 2019-01-09

## 2019-01-09 NOTE — TELEPHONE ENCOUNTER
Patient requested for all of his records to be transferred to [facility]  Due to the large amount, he has agreed to come to the [place] office to pick them up  They are available for pickup @ the

## 2019-02-26 ENCOUNTER — TELEPHONE (OUTPATIENT)
Dept: UROLOGY | Facility: MEDICAL CENTER | Age: 79
End: 2019-02-26

## 2019-02-26 NOTE — TELEPHONE ENCOUNTER
Returned patient's call  Patient prefers to continue care with Dr Miri Danielson  Per office visit note from November, patient will keep appt in May with provider  He will get labs and CT done prior to visit

## 2019-02-26 NOTE — TELEPHONE ENCOUNTER
Patient called stating that he saw another provider for a second opinion, and he wishes to proceed with surgery with Dr Elise Record   He has a followup appointment already scheduled with him for 05/20/19, but wants to know if he can be seen sooner  He can be reached on either his home number or his cell number

## 2019-05-01 ENCOUNTER — TELEPHONE (OUTPATIENT)
Dept: UROLOGY | Facility: AMBULATORY SURGERY CENTER | Age: 79
End: 2019-05-01

## 2019-05-07 LAB
BUN SERPL-MCNC: 22 MG/DL (ref 7–25)
BUN/CREAT SERPL: ABNORMAL (CALC) (ref 6–22)
CALCIUM SERPL-MCNC: 9.1 MG/DL (ref 8.6–10.3)
CHLORIDE SERPL-SCNC: 106 MMOL/L (ref 98–110)
CO2 SERPL-SCNC: 26 MMOL/L (ref 20–32)
CREAT SERPL-MCNC: 0.95 MG/DL (ref 0.7–1.18)
GLUCOSE SERPL-MCNC: 162 MG/DL (ref 65–99)
POTASSIUM SERPL-SCNC: 4.5 MMOL/L (ref 3.5–5.3)
PSA SERPL-MCNC: 3.6 NG/ML
SL AMB EGFR AFRICAN AMERICAN: 90 ML/MIN/1.73M2
SL AMB EGFR NON AFRICAN AMERICAN: 77 ML/MIN/1.73M2
SODIUM SERPL-SCNC: 139 MMOL/L (ref 135–146)

## 2019-05-13 ENCOUNTER — HOSPITAL ENCOUNTER (OUTPATIENT)
Dept: RADIOLOGY | Facility: HOSPITAL | Age: 79
Discharge: HOME | End: 2019-05-13
Attending: UROLOGY
Payer: MEDICARE

## 2019-05-13 DIAGNOSIS — N28.89 RENAL MASS: ICD-10-CM

## 2019-05-13 PROCEDURE — 74178 CT ABD&PLV WO CNTR FLWD CNTR: CPT

## 2019-05-13 RX ADMIN — IOHEXOL 100 ML: 350 INJECTION, SOLUTION INTRAVENOUS at 11:32

## 2019-05-20 ENCOUNTER — TELEPHONE (OUTPATIENT)
Dept: UROLOGY | Facility: AMBULATORY SURGERY CENTER | Age: 79
End: 2019-05-20

## 2019-05-20 ENCOUNTER — OFFICE VISIT (OUTPATIENT)
Dept: UROLOGY | Facility: AMBULATORY SURGERY CENTER | Age: 79
End: 2019-05-20
Payer: MEDICARE

## 2019-05-20 VITALS
BODY MASS INDEX: 22.85 KG/M2 | SYSTOLIC BLOOD PRESSURE: 128 MMHG | HEIGHT: 71 IN | WEIGHT: 163.2 LBS | HEART RATE: 84 BPM | DIASTOLIC BLOOD PRESSURE: 70 MMHG

## 2019-05-20 DIAGNOSIS — N28.89 RENAL MASS: Primary | ICD-10-CM

## 2019-05-20 PROCEDURE — 99214 OFFICE O/P EST MOD 30 MIN: CPT | Performed by: UROLOGY

## 2019-05-20 RX ORDER — BLOOD SUGAR DIAGNOSTIC
STRIP MISCELLANEOUS
Refills: 1 | COMMUNITY
Start: 2019-05-08

## 2019-05-20 RX ORDER — LANCETS
EACH MISCELLANEOUS
Refills: 1 | COMMUNITY
Start: 2019-05-08

## 2019-05-29 ENCOUNTER — TELEPHONE (OUTPATIENT)
Dept: VASCULAR SURGERY | Facility: CLINIC | Age: 79
End: 2019-05-29

## 2019-07-24 ENCOUNTER — TELEPHONE (OUTPATIENT)
Dept: UROLOGY | Facility: AMBULATORY SURGERY CENTER | Age: 79
End: 2019-07-24

## 2019-07-24 NOTE — TELEPHONE ENCOUNTER
Patient has ov with Chantal Coronel and Dr Almas Bermudez is not available to ask him  He stated that he was supposed to see Angel Buchanan and he wanted to know if he should keep the appointment that was scheduled with Loren Arriaza or reschedule with Angel Buchanan

## 2019-07-24 NOTE — TELEPHONE ENCOUNTER
Georgette Keny is a patient of Franc Lee and is seen at [facility]    Patient would like a call back to see if the vascular doctor he is seeing on 7/25/2019 is approved by doctor Franc Lee

## 2019-07-25 ENCOUNTER — OFFICE VISIT (OUTPATIENT)
Dept: VASCULAR SURGERY | Facility: CLINIC | Age: 79
End: 2019-07-25
Payer: MEDICARE

## 2019-07-25 VITALS
RESPIRATION RATE: 16 BRPM | HEIGHT: 71 IN | WEIGHT: 160 LBS | SYSTOLIC BLOOD PRESSURE: 130 MMHG | DIASTOLIC BLOOD PRESSURE: 80 MMHG | BODY MASS INDEX: 22.4 KG/M2 | HEART RATE: 62 BPM

## 2019-07-25 DIAGNOSIS — N28.89 RENAL MASS: ICD-10-CM

## 2019-07-25 PROCEDURE — 99204 OFFICE O/P NEW MOD 45 MIN: CPT | Performed by: RADIOLOGY

## 2019-07-25 NOTE — PROGRESS NOTES
Assessment/Plan:    Renal mass  Mr Emelyn Morrow is a 53-year-old male with grossly stable 17 mm enhancing left renal mass highly suspicious for malignancy  Although the mass is small and has been stable over several years, given significant anxiety, the patient would like to actively intervene on this mass  He is seen in IR clinic with his wife to discuss cryoablation  Given review of his imaging, this mass is quite amenable for ablation  I discussed with him and his wife procedural details, benefits and risks  I also explained that I would biopsy the lesion at the same time  I explained the biggest risk is bleeding for which we would monitor him for 4 hours postprocedure  I explained that most patients go home same day  There was a long discussion, mostly between him and his wife, over the type of sedation  Although the wife really encouraged him to use moderate sedation, he does not feel that he would tolerate the procedure without being fully under with general anesthesia given his severe anxiety  After full discussion, the patient would like to proceed for cryoablation of his left renal mass under general anesthesia  I requested for him to obtain updated blood work  Diagnoses and all orders for this visit:    Renal mass  -     CBC  -     Protime-INR; Future  -     Basic metabolic panel; Future  -     Cancel: IR consult; Future  -     IR cryo ablation; Future          Subjective:      Patient ID: Melvin Sanchez is a 70 y o  male  Patient is new to our practice and was referred by Dr Yan Lei (urology) to discuss options regarding a renal mass  Pt has not had any chemo or radiation therapy  Stefani Galarza is a 53-year-old male accompanied by his wife to interventional radiology clinic referred by Dr Yan Lei of Urology  Patient has been well followed by Dr Yan Lei with active surveillance of grossly stable left renal mass and [place] 6 prostate cancer    Over the last few years, the patient was comfortable with active surveillance  At this time, with discussion with Dr Fe Garza, the patient feels that he would like to have treatment  He is quite anxious at the thought that this mass could represent kidney cancer  Patient is without current complaints excluding significant anxiety about his left renal mass  Patient denies any hematuria or flank pain  The following portions of the patient's history were reviewed and updated as appropriate: allergies, current medications, past family history, past medical history, past social history, past surgical history and problem list     Review of Systems   Constitutional: Negative  HENT: Negative  Eyes: Negative  Respiratory: Negative  Cardiovascular: Negative  Gastrointestinal: Negative  Genitourinary: Positive for frequency and urgency  Musculoskeletal: Negative  Skin: Negative  Allergic/Immunologic: Negative  Neurological: Positive for dizziness  Hematological: Negative  Psychiatric/Behavioral: The patient is nervous/anxious  Objective:    I have reviewed and made appropriate changes to the review of systems input by the medical assistant  Vitals:    07/25/19 1425   BP: 130/80   BP Location: Left arm   Patient Position: Sitting   Pulse: 62   Resp: 16   Weight: 72 6 kg (160 lb)   Height: 5' 10 5" (1 791 m)       Patient Active Problem List   Diagnosis    Renal mass    Prostate cancer [facility])       Past Surgical History:   Procedure Laterality Date    [place] COLONOSCOPY FLX DX W/COLLJ [address] WHEN PFRMD N/A 8/19/2016    Procedure: COLONOSCOPY;  Surgeon: Robbie Rawls MD;  [facility];   Service: Colorectal    WISDOM TOOTH EXTRACTION         Family History   Problem Relation Age of Onset    No Known Problems Father     No Known Problems Mother        Social History     Socioeconomic History    Marital status: /Civil Union     Spouse name: Not on file    Number of children: Not on file    Years of education: Not on file    Highest education level: Not on file   Occupational History    Not on file   Social Needs    Financial resource strain: Not on file    Food insecurity:     Worry: Not on file     Inability: Not on file    Transportation needs:     Medical: Not on file     Non-medical: Not on file   Tobacco Use    Smoking status: Former Smoker    Smokeless tobacco: Never Used   Substance and Sexual Activity    Alcohol use: Yes     Comment: Social    Drug use: No    Sexual activity: Not on file   Lifestyle    Physical activity:     Days per week: Not on file     Minutes per session: Not on file    Stress: Not on file   Relationships    Social connections:     Talks on phone: Not on file     Gets together: Not on file     Attends Jehovah's witness service: Not on file     Active member of club or organization: Not on file     Attends meetings of clubs or organizations: Not on file     Relationship status: Not on file    Intimate partner violence:     Fear of current or ex partner: Not on file     Emotionally abused: Not on file     Physically abused: Not on file     Forced sexual activity: Not on file   Other Topics Concern    Not on file   Social History Narrative    Not on file       Allergies   Allergen Reactions    Aluminum-Containing Compounds Other (See Comments)     Lips Swelling    Penicillins          Current Outpatient Medications:     ACCU-CHEK FASTCLIX LANCETS MISC, , Disp: , Rfl: 1    ACCU-CHEK GUIDE test strip, , Disp: , Rfl: 1    aspirin 81 MG tablet, Take 81 mg by mouth daily  , Disp: , Rfl:     calcium carbonate (OS-HAM) 600 MG tablet, Take 600 mg by mouth 2 (two) times a week , Disp: , Rfl:     Cholecalciferol (VITAMIN D-3 PO), Take 1,000 Units by mouth every other day , Disp: , Rfl:     glipiZIDE-metFORMIN (METAGLIP) 2 5-250 MG per tablet, Take 1 tablet by mouth daily  , Disp: , Rfl:     ramipril (ALTACE) 2 5 mg capsule, Take 2 5 mg by mouth daily  , Disp: , Rfl:     rosuvastatin (CRESTOR) 10 MG tablet, Take 10 mg by mouth 3 (three) times a week , Disp: , Rfl:     terazosin (HYTRIN) 2 mg capsule, Take 2 mg by mouth daily at bedtime  , Disp: , Rfl:        Physical Exam   Constitutional: He is oriented to person, place, and time  He appears well-developed and well-nourished  Eyes: Pupils are equal, round, and reactive to light  EOM are normal    Neck: Normal range of motion  Neck supple  Cardiovascular: Normal rate and regular rhythm  Pulmonary/Chest: Effort normal and breath sounds normal    Abdominal: Soft  Bowel sounds are normal    Musculoskeletal: Normal range of motion  Neurological: He is alert and oriented to person, place, and time  Skin: Skin is warm and dry  Psychiatric: He has a normal mood and affect  His behavior is normal    anxious         Imaging:  I personally reviewed the imaging  CT abdomen/pelvis 5/13/2019:  Stable enhancing left renal mass measuring 17 mm  CT abdomen/pelvis 10/12/2016:  Stable enhancing left renal mass measuring 17 mm  CT abdomen (renal protocol):  14 mm enhancing nodule in left renal midpole      Labs:  5/6/2019:  Creatinine 0 95, sodium 139, potassium 4 5

## 2019-07-25 NOTE — TELEPHONE ENCOUNTER
LMOM that I had spoken with Dr Li Hinojosa and he recommended that the patient keep his ov for today

## 2019-07-26 NOTE — ASSESSMENT & PLAN NOTE
Mr Suri Gardner is a  67-year-old male with grossly stable 17 mm enhancing left renal mass highly suspicious for malignancy  Although the mass is small and has been stable over several years, given significant anxiety, the patient would like to actively intervene on this mass  He is seen in IR clinic with his wife to discuss cryoablation  Given review of his imaging, this mass is quite amenable for ablation  I discussed with him and his wife procedural details, benefits and risks  I also explained that I would biopsy the lesion at the same time  I explained the biggest risk is bleeding for which we would monitor him for 4 hours postprocedure  I explained that most patients go home same day  There was a long discussion, mostly between him and his wife, over the type of sedation  Although the wife really encouraged him to use moderate sedation, he does not feel that he would tolerate the procedure without being fully under with general anesthesia given his severe anxiety  After full discussion, the patient would like to proceed for cryoablation of his left renal mass under general anesthesia  I requested for him to obtain updated blood work

## 2019-08-14 ENCOUNTER — TELEPHONE (OUTPATIENT)
Dept: UROLOGY | Facility: AMBULATORY SURGERY CENTER | Age: 79
End: 2019-08-14

## 2019-08-14 NOTE — TELEPHONE ENCOUNTER
Patient came into the office   He let us know that he is going to have a surgery done with Dr Arielle sandy/ Vascular   Guerline Everett wanted to be informed when he was having it     Surgery will be done   9/9/19

## 2019-08-14 NOTE — TELEPHONE ENCOUNTER
Patient needs follow up with Dr Lilliana Jha, following IR biopsy  Scheduled for 9/20/19 at 11:30 in the [facility]

## 2019-09-04 ENCOUNTER — APPOINTMENT (OUTPATIENT)
Dept: LAB | Facility: CLINIC | Age: 79
End: 2019-09-04
Payer: MEDICARE

## 2019-09-04 DIAGNOSIS — N28.89 RENAL MASS: ICD-10-CM

## 2019-09-04 DIAGNOSIS — C61 PROSTATE CANCER (HCC): ICD-10-CM

## 2019-09-04 LAB
ANION GAP SERPL CALCULATED.3IONS-SCNC: 4 MMOL/L (ref 4–13)
BUN SERPL-MCNC: 25 MG/DL (ref 5–25)
CALCIUM SERPL-MCNC: 9.3 MG/DL (ref 8.3–10.1)
CHLORIDE SERPL-SCNC: 108 MMOL/L (ref 100–108)
CO2 SERPL-SCNC: 27 MMOL/L (ref 21–32)
CREAT SERPL-MCNC: 1.01 MG/DL (ref 0.6–1.3)
ERYTHROCYTE [DISTWIDTH] IN BLOOD BY AUTOMATED COUNT: 13.2 % (ref 11.6–15.1)
GFR SERPL CREATININE-BSD FRML MDRD: 72 ML/MIN/1.73SQ M
GLUCOSE P FAST SERPL-MCNC: 140 MG/DL (ref 65–99)
HCT VFR BLD AUTO: 45 % (ref 36.5–49.3)
HGB BLD-MCNC: 14.7 G/DL (ref 12–17)
INR PPP: 1.03 (ref 0.84–1.19)
MCH RBC QN AUTO: 30.9 PG (ref 26.8–34.3)
MCHC RBC AUTO-ENTMCNC: 32.7 G/DL (ref 31.4–37.4)
MCV RBC AUTO: 95 FL (ref 82–98)
PLATELET # BLD AUTO: 150 THOUSANDS/UL (ref 149–390)
PMV BLD AUTO: 9.4 FL (ref 8.9–12.7)
POTASSIUM SERPL-SCNC: 4.4 MMOL/L (ref 3.5–5.3)
PROTHROMBIN TIME: 13.1 SECONDS (ref 11.6–14.5)
PSA SERPL-MCNC: 3.7 NG/ML (ref 0–4)
RBC # BLD AUTO: 4.75 MILLION/UL (ref 3.88–5.62)
SODIUM SERPL-SCNC: 139 MMOL/L (ref 136–145)
WBC # BLD AUTO: 4.5 THOUSAND/UL (ref 4.31–10.16)

## 2019-09-04 PROCEDURE — 84153 ASSAY OF PSA TOTAL: CPT

## 2019-09-04 PROCEDURE — 80048 BASIC METABOLIC PNL TOTAL CA: CPT

## 2019-09-04 PROCEDURE — 85027 COMPLETE CBC AUTOMATED: CPT | Performed by: RADIOLOGY

## 2019-09-04 PROCEDURE — 36415 COLL VENOUS BLD VENIPUNCTURE: CPT | Performed by: RADIOLOGY

## 2019-09-04 PROCEDURE — 85610 PROTHROMBIN TIME: CPT

## 2019-09-09 ENCOUNTER — ANESTHESIA (OUTPATIENT)
Dept: RADIOLOGY | Facility: HOSPITAL | Age: 79
End: 2019-09-09
Payer: MEDICARE

## 2019-09-09 ENCOUNTER — ANESTHESIA EVENT (OUTPATIENT)
Dept: RADIOLOGY | Facility: HOSPITAL | Age: 79
End: 2019-09-09
Payer: MEDICARE

## 2019-09-09 ENCOUNTER — HOSPITAL ENCOUNTER (OUTPATIENT)
Dept: RADIOLOGY | Facility: HOSPITAL | Age: 79
Discharge: HOME | End: 2019-09-09
Attending: RADIOLOGY | Admitting: RADIOLOGY
Payer: MEDICARE

## 2019-09-09 VITALS
BODY MASS INDEX: 21.42 KG/M2 | TEMPERATURE: 97 F | OXYGEN SATURATION: 94 % | HEART RATE: 63 BPM | WEIGHT: 153 LBS | DIASTOLIC BLOOD PRESSURE: 83 MMHG | HEIGHT: 71 IN | RESPIRATION RATE: 13 BRPM | SYSTOLIC BLOOD PRESSURE: 150 MMHG

## 2019-09-09 DIAGNOSIS — N28.89 RENAL MASS: ICD-10-CM

## 2019-09-09 LAB
ANION GAP SERPL CALCULATED.3IONS-SCNC: 3 MMOL/L (ref 4–13)
ATRIAL RATE: 48 BPM
ATRIAL RATE: 50 BPM
BUN SERPL-MCNC: 21 MG/DL (ref 5–25)
CALCIUM SERPL-MCNC: 8.9 MG/DL (ref 8.3–10.1)
CHLORIDE SERPL-SCNC: 109 MMOL/L (ref 100–108)
CO2 SERPL-SCNC: 27 MMOL/L (ref 21–32)
CREAT SERPL-MCNC: 0.96 MG/DL (ref 0.6–1.3)
ERYTHROCYTE [DISTWIDTH] IN BLOOD BY AUTOMATED COUNT: 13 % (ref 11.6–15.1)
GFR SERPL CREATININE-BSD FRML MDRD: 76 ML/MIN/1.73SQ M
GLUCOSE P FAST SERPL-MCNC: 160 MG/DL (ref 65–99)
GLUCOSE SERPL-MCNC: 158 MG/DL (ref 65–140)
GLUCOSE SERPL-MCNC: 160 MG/DL (ref 65–140)
GLUCOSE SERPL-MCNC: 179 MG/DL (ref 65–140)
GLUCOSE SERPL-MCNC: 193 MG/DL (ref 65–140)
HCT VFR BLD AUTO: 46.5 % (ref 36.5–49.3)
HGB BLD-MCNC: 15.5 G/DL (ref 12–17)
INR PPP: 1.04 (ref 0.84–1.19)
MCH RBC QN AUTO: 31.7 PG (ref 26.8–34.3)
MCHC RBC AUTO-ENTMCNC: 33.3 G/DL (ref 31.4–37.4)
MCV RBC AUTO: 95 FL (ref 82–98)
P AXIS: 38 DEGREES
P AXIS: 41 DEGREES
PLATELET # BLD AUTO: 151 THOUSANDS/UL (ref 149–390)
PMV BLD AUTO: 9.4 FL (ref 8.9–12.7)
POTASSIUM SERPL-SCNC: 4.2 MMOL/L (ref 3.5–5.3)
PR INTERVAL: 146 MS
PR INTERVAL: 152 MS
PROTHROMBIN TIME: 13.2 SECONDS (ref 11.6–14.5)
QRS AXIS: 30 DEGREES
QRS AXIS: 31 DEGREES
QRSD INTERVAL: 80 MS
QRSD INTERVAL: 88 MS
QT INTERVAL: 434 MS
QT INTERVAL: 452 MS
QTC INTERVAL: 395 MS
QTC INTERVAL: 403 MS
RBC # BLD AUTO: 4.89 MILLION/UL (ref 3.88–5.62)
SODIUM SERPL-SCNC: 139 MMOL/L (ref 136–145)
T WAVE AXIS: 25 DEGREES
T WAVE AXIS: 36 DEGREES
VENTRICULAR RATE: 48 BPM
VENTRICULAR RATE: 50 BPM
WBC # BLD AUTO: 4.3 THOUSAND/UL (ref 4.31–10.16)

## 2019-09-09 PROCEDURE — C2618 PROBE/NEEDLE, CRYO: HCPCS

## 2019-09-09 PROCEDURE — 85027 COMPLETE CBC AUTOMATED: CPT | Performed by: RADIOLOGY

## 2019-09-09 PROCEDURE — 93010 ELECTROCARDIOGRAM REPORT: CPT | Performed by: INTERNAL MEDICINE

## 2019-09-09 PROCEDURE — 50200 RENAL BIOPSY PERQ: CPT | Performed by: RADIOLOGY

## 2019-09-09 PROCEDURE — 50593 PERC CRYO ABLATE RENAL TUM: CPT

## 2019-09-09 PROCEDURE — 88305 TISSUE EXAM BY PATHOLOGIST: CPT | Performed by: PATHOLOGY

## 2019-09-09 PROCEDURE — 88342 IMHCHEM/IMCYTCHM 1ST ANTB: CPT | Performed by: PATHOLOGY

## 2019-09-09 PROCEDURE — 93005 ELECTROCARDIOGRAM TRACING: CPT

## 2019-09-09 PROCEDURE — 88341 IMHCHEM/IMCYTCHM EA ADD ANTB: CPT | Performed by: PATHOLOGY

## 2019-09-09 PROCEDURE — 50593 PERC CRYO ABLATE RENAL TUM: CPT | Performed by: RADIOLOGY

## 2019-09-09 PROCEDURE — 77013 CT GUIDE FOR TISSUE ABLATION: CPT

## 2019-09-09 PROCEDURE — 82948 REAGENT STRIP/BLOOD GLUCOSE: CPT

## 2019-09-09 PROCEDURE — 85610 PROTHROMBIN TIME: CPT | Performed by: RADIOLOGY

## 2019-09-09 PROCEDURE — 80048 BASIC METABOLIC PNL TOTAL CA: CPT | Performed by: RADIOLOGY

## 2019-09-09 PROCEDURE — 50200 RENAL BIOPSY PERQ: CPT

## 2019-09-09 PROCEDURE — 77013 CT GUIDE FOR TISSUE ABLATION: CPT | Performed by: RADIOLOGY

## 2019-09-09 RX ORDER — PROPOFOL 10 MG/ML
INJECTION, EMULSION INTRAVENOUS AS NEEDED
Status: DISCONTINUED | OUTPATIENT
Start: 2019-09-09 | End: 2019-09-09 | Stop reason: SURG

## 2019-09-09 RX ORDER — FENTANYL CITRATE 50 UG/ML
INJECTION, SOLUTION INTRAMUSCULAR; INTRAVENOUS AS NEEDED
Status: DISCONTINUED | OUTPATIENT
Start: 2019-09-09 | End: 2019-09-09 | Stop reason: SURG

## 2019-09-09 RX ORDER — SUCCINYLCHOLINE/SOD CL,ISO/PF 100 MG/5ML
SYRINGE (ML) INTRAVENOUS AS NEEDED
Status: DISCONTINUED | OUTPATIENT
Start: 2019-09-09 | End: 2019-09-09 | Stop reason: SURG

## 2019-09-09 RX ORDER — LIDOCAINE HYDROCHLORIDE 10 MG/ML
INJECTION, SOLUTION INFILTRATION; PERINEURAL AS NEEDED
Status: DISCONTINUED | OUTPATIENT
Start: 2019-09-09 | End: 2019-09-09 | Stop reason: SURG

## 2019-09-09 RX ORDER — SODIUM CHLORIDE 9 MG/ML
175 INJECTION, SOLUTION INTRAVENOUS CONTINUOUS
Status: DISCONTINUED | OUTPATIENT
Start: 2019-09-09 | End: 2019-09-09 | Stop reason: HOSPADM

## 2019-09-09 RX ORDER — LEVOFLOXACIN 5 MG/ML
500 INJECTION, SOLUTION INTRAVENOUS ONCE
Status: COMPLETED | OUTPATIENT
Start: 2019-09-09 | End: 2019-09-09

## 2019-09-09 RX ORDER — DEXAMETHASONE SODIUM PHOSPHATE 10 MG/ML
INJECTION, SOLUTION INTRAMUSCULAR; INTRAVENOUS AS NEEDED
Status: DISCONTINUED | OUTPATIENT
Start: 2019-09-09 | End: 2019-09-09 | Stop reason: SURG

## 2019-09-09 RX ORDER — ONDANSETRON 2 MG/ML
4 INJECTION INTRAMUSCULAR; INTRAVENOUS EVERY 6 HOURS PRN
Status: DISCONTINUED | OUTPATIENT
Start: 2019-09-09 | End: 2019-09-09 | Stop reason: HOSPADM

## 2019-09-09 RX ORDER — EPHEDRINE SULFATE 50 MG/ML
INJECTION INTRAVENOUS AS NEEDED
Status: DISCONTINUED | OUTPATIENT
Start: 2019-09-09 | End: 2019-09-09 | Stop reason: SURG

## 2019-09-09 RX ORDER — OXYCODONE HYDROCHLORIDE 5 MG/1
5 TABLET ORAL EVERY 4 HOURS PRN
Status: DISCONTINUED | OUTPATIENT
Start: 2019-09-09 | End: 2019-09-09 | Stop reason: HOSPADM

## 2019-09-09 RX ORDER — FENTANYL CITRATE/PF 50 MCG/ML
25 SYRINGE (ML) INJECTION
Status: DISCONTINUED | OUTPATIENT
Start: 2019-09-09 | End: 2019-09-09 | Stop reason: HOSPADM

## 2019-09-09 RX ORDER — ONDANSETRON 2 MG/ML
INJECTION INTRAMUSCULAR; INTRAVENOUS AS NEEDED
Status: DISCONTINUED | OUTPATIENT
Start: 2019-09-09 | End: 2019-09-09 | Stop reason: SURG

## 2019-09-09 RX ORDER — SODIUM CHLORIDE 9 MG/ML
75 INJECTION, SOLUTION INTRAVENOUS CONTINUOUS
Status: DISCONTINUED | OUTPATIENT
Start: 2019-09-09 | End: 2019-09-09 | Stop reason: HOSPADM

## 2019-09-09 RX ORDER — ONDANSETRON 2 MG/ML
4 INJECTION INTRAMUSCULAR; INTRAVENOUS ONCE AS NEEDED
Status: DISCONTINUED | OUTPATIENT
Start: 2019-09-09 | End: 2019-09-09 | Stop reason: HOSPADM

## 2019-09-09 RX ADMIN — SODIUM CHLORIDE 75 ML/HR: 0.9 INJECTION, SOLUTION INTRAVENOUS at 10:29

## 2019-09-09 RX ADMIN — PROPOFOL 150 MG: 10 INJECTION, EMULSION INTRAVENOUS at 11:47

## 2019-09-09 RX ADMIN — EPHEDRINE SULFATE 10 MG: 50 INJECTION, SOLUTION INTRAVENOUS at 13:19

## 2019-09-09 RX ADMIN — DEXAMETHASONE SODIUM PHOSPHATE 5 MG: 10 INJECTION, SOLUTION INTRAMUSCULAR; INTRAVENOUS at 12:25

## 2019-09-09 RX ADMIN — Medication 100 MG: at 11:47

## 2019-09-09 RX ADMIN — FENTANYL CITRATE 50 MCG: 50 INJECTION, SOLUTION INTRAMUSCULAR; INTRAVENOUS at 12:15

## 2019-09-09 RX ADMIN — PHENYLEPHRINE HYDROCHLORIDE 100 MCG: 10 INJECTION INTRAVENOUS at 12:07

## 2019-09-09 RX ADMIN — EPHEDRINE SULFATE 10 MG: 50 INJECTION, SOLUTION INTRAVENOUS at 12:50

## 2019-09-09 RX ADMIN — ONDANSETRON 4 MG: 2 INJECTION INTRAMUSCULAR; INTRAVENOUS at 12:25

## 2019-09-09 RX ADMIN — PHENYLEPHRINE HYDROCHLORIDE 100 MCG: 10 INJECTION INTRAVENOUS at 12:24

## 2019-09-09 RX ADMIN — FENTANYL CITRATE 50 MCG: 50 INJECTION, SOLUTION INTRAMUSCULAR; INTRAVENOUS at 11:47

## 2019-09-09 RX ADMIN — EPHEDRINE SULFATE 10 MG: 50 INJECTION, SOLUTION INTRAVENOUS at 13:22

## 2019-09-09 RX ADMIN — LEVOFLOXACIN 500 MG: 5 INJECTION, SOLUTION INTRAVENOUS at 11:59

## 2019-09-09 RX ADMIN — LIDOCAINE HYDROCHLORIDE 50 MG: 10 INJECTION, SOLUTION INFILTRATION; PERINEURAL at 11:47

## 2019-09-09 NOTE — ANESTHESIA POSTPROCEDURE EVALUATION
Post-Op Assessment Note    CV Status:  Stable    Pain management: adequate     Mental Status:  Alert and awake   Hydration Status:  Euvolemic   PONV Controlled:  Controlled   Airway Patency:  Patent   Post Op Vitals Reviewed: Yes      Staff: CRNA           BP  170/85   Temp   97   Pulse  64   Resp   18   SpO2 98

## 2019-09-09 NOTE — BRIEF OP NOTE (RAD/CATH)
Left renal mass biopsy and cryoablation procedure note    PATIENT NAME: Zheng Anna  : 1940  MRN: [id number]     Pre-op Diagnosis:   1  Renal mass      Post-op Diagnosis:   1  Renal mass        Surgeon:   Baron Jose DO  Assistants:     No qualified resident was available  Estimated Blood Loss:  None  Findings:  Left renal mass, adequate ablation zone via CT confirmation  Stable small perinephric hematoma  Specimens:  Left renal mass  Complications:  Stable small perinephric hematoma      Anesthesia: General    Baron Jose DO     Date: 2019  Time: 1:46 PM

## 2019-09-09 NOTE — SEDATION DOCUMENTATION
IR Procedure Bedrest Start Time is 1342 x4 hrs and NPO for the first 2 hrs per MD Guzmán  PT will be transferred to PACU  Report given to [facility] LINDA Cruz

## 2019-09-09 NOTE — ANESTHESIA PREPROCEDURE EVALUATION
Review of Systems/Medical History  Patient summary reviewed  Chart reviewed  No history of anesthetic complications     Cardiovascular  EKG reviewed, Exercise tolerance (METS): >4,  Hyperlipidemia, Hypertension ,    Pulmonary  Negative pulmonary ROS        GI/Hepatic  Negative GI/hepatic ROS          Genitourinary malignancy , Prostatic disorder, history of prostate cancer       Endo/Other  Diabetes well controlled type 2 Diet controlled,      GYN       Hematology  Negative hematology ROS      Musculoskeletal       Neurology  Negative neurology ROS      Psychology   Negative psychology ROS          Patient is a 69 y/o man with PMH of HTn, HLD, DM, Prostate CA and kidney lesion for ablation today  METS > 4  Physical Exam    Airway    Mallampati score: II  TM Distance: >3 FB  Neck ROM: full     Dental   No notable dental hx     Cardiovascular  Rhythm: regular, Rate: normal, Cardiovascular exam normal    Pulmonary  Pulmonary exam normal Breath sounds clear to auscultation,     Other Findings        Anesthesia Plan  ASA Score- 2     Anesthesia Type- general with ASA Monitors  Additional Monitors:   Airway Plan: ETT  Plan Factors- Patient instructed to abstain from smoking on day of procedure  Patient did not smoke on day of surgery  Induction- intravenous  Postoperative Plan-   Planned trial extubation    Informed Consent- Anesthetic plan and risks discussed with patient  I personally reviewed this patient with the CRNA  Discussed and agreed on the Anesthesia Plan with the CRNA  Jacqueline Pérez

## 2019-09-09 NOTE — DISCHARGE INSTRUCTIONS
Kidney Tumor Ablation     WHAT YOU NEED TO KNOW:                                                                   Kidney tumor ablation is a procedure to destroy cancer cells in your kidney without removing them  Using image guidance, a probe is inserted into the tumor  Ablations can be done using high energy radio waves (RFA)  Or using microwave energy  Heat destroys the abnormal tissue  A cyroablation destroys abnormal tissue by using cold temperatures  DISCHARGE INSTRUCTIONS:         You may resume your normal diet and medications  Small sips of flat soda will help with nausea  Follow up with your healthcare provider as directed: Write down your questions so you remember to ask them during your visits  Rest as needed: Slowly start to do more each day  Return to your daily activities as directed by your healthcare provider  Contact your healthcare provider if:  Emeli [place] and [place]  Patients Contact Interventional  Radiology at [phone] PATIENTS: Contact Interventional Radiology at [phone])      [place] PATIENTS: Contact Interventional Radiology at [phone]) if any of the following occur: You have severe pain that does not get better with medicine  Difficulty breathing, nausea or vomiting  Chills or fever above 101 degrees F  Develop any redness, swelling, heat, unusual drainage, heavy bruising or                          bleeding from the probe sites  You continue to have pain a week after your procedure  The above information is an  only  It is not intended as medical advice for individual conditions or treatments   Talk to your doctor, nurse or pharmacist before following any medical regimen to see if it is safe and effective for you        Percutaneous Kidney Biopsy   WHAT YOU NEED TO KNOW:   A percutaneous kidney biopsy is a procedure to remove a small sample of kidney tissue  It may also be done to check for kidney disease or cancer  DISCHARGE INSTRUCTIONS:   Follow up with your healthcare provider as directed:  Write down your questions so you remember to ask them during your visits  Wound care: The Band-Aid may be removed in 24 hours  For more information:   · National Kidney and Urologic Diseases Information Clearinghouse  [address] , [place] [address]  Phone: 7- 150 - 230-3708  Web Address: http://kidney  niddk nih gov/   Care after your procedure:    1  Limit your activities for 36 hours after your biopsy  2  No driving day of biopsy  3  Return to your normal diet  Flat sips of flat soda helps with mild nausea  4  Remove band-aid or dressing 24 hours after procedure  Contact Interventional Radiology at [phone]    Marilyn PATIENTS: Contact Interventional Radiology at [phone]) Adeola Barth PATIENTS: Contact Interventional Radiology at [phone]) if:    1  Difficulty breathing, nausea or vomiting  2  You feel weak or dizzy  3  Chills or fever above 101 degrees F  You have persistent nausea or vomiting    4  You have severe pain in your abdomen or where You feel weak or dizzy  your           procedure was done  5  You have blood in your urine  You urinate small amounts or not at all  4  Develop any redness, swelling, heat, unusual drainage, heavy bruising or bleeding     from biopsy site

## 2019-09-11 ENCOUNTER — TELEPHONE (OUTPATIENT)
Dept: RADIOLOGY | Facility: HOSPITAL | Age: 79
End: 2019-09-11

## 2019-09-11 ENCOUNTER — TELEPHONE (OUTPATIENT)
Dept: UROLOGY | Facility: AMBULATORY SURGERY CENTER | Age: 79
End: 2019-09-11

## 2019-09-11 NOTE — TELEPHONE ENCOUNTER
Follow-up phone call status post left renal mass cryoablation 2 days ago  Patient is overall doing well  Patient states that he has occasional dizziness but otherwise does not feel lightheaded  Denies chest pain or shortness of breath  Patient does complain of some stomach pain  There is mild bruising at the access site on the left flank  I requested for him to go to the emergency room if the dizziness worsens, feels lightheaded, experiences chest pain or shortness of breath   Regarding his stomach pain, it is doubtful that the cryoablation procedure would cause any issues with his stomach pain  I recommended for him to follow-up with his primary care physician  I gave a preliminary result of his biopsy to him over the phone  I explained that he will see Dr Frankey Gulling next week and that he will explain thoroughly the biopsy result as well as the plan going forward

## 2019-09-11 NOTE — TELEPHONE ENCOUNTER
Patient managed by Dr Yumiko Xavier in the [place] office  Patient had a kidney mass and had a biopsy, the report will be in [facility] shortly

## 2019-09-20 ENCOUNTER — OFFICE VISIT (OUTPATIENT)
Dept: UROLOGY | Facility: AMBULATORY SURGERY CENTER | Age: 79
End: 2019-09-20
Payer: MEDICARE

## 2019-09-20 VITALS
HEART RATE: 72 BPM | WEIGHT: 153 LBS | DIASTOLIC BLOOD PRESSURE: 82 MMHG | HEIGHT: 70 IN | SYSTOLIC BLOOD PRESSURE: 138 MMHG | BODY MASS INDEX: 21.9 KG/M2

## 2019-09-20 DIAGNOSIS — C64.2 MALIGNANT NEOPLASM OF LEFT KIDNEY (HCC): Primary | ICD-10-CM

## 2019-09-20 PROCEDURE — 99214 OFFICE O/P EST MOD 30 MIN: CPT | Performed by: UROLOGY

## 2019-09-20 NOTE — PROGRESS NOTES
9/20/2019    Dg Avila  1940  [id number]        Assessment  Renal cell carcinoma status post percutaneous cryoablation 9/09/2019  Surveillance Sergio 6 prostate cancer    Plan  He did well status post percutaneous cryotherapy  Pathology revealed renal cell carcinoma, low-grade  Discussed pathology with the patient and his wife  Recommend follow-up CT scan in the next 3 to 4 months to evaluate  Will then follow for total of 5 years periodically  Continue with annual PSA at his request for prostate cancer surveillance  All questions answered to his satisfaction  History of Present Illness  Ale Bearden is a 70 y o  male recently status post percutaneous biopsy and cryoablation of small left renal mass  He tolerated this very well without complaint  He did have some anesthesia complication and felt weak and lightheaded after the procedure  It took about 3 days until his symptoms resolved  He is doing well now with no complaints  PSA recently 3 7  He has declined follow-up prostate biopsy for his [place] 6 prostate cancer  He would like to follow with annual PSA  Review of Systems  Review of Systems   Constitutional: Negative  HENT: Negative  Respiratory: Negative  Cardiovascular: Negative  Gastrointestinal: Negative  Genitourinary:        As per HPI   Musculoskeletal: Negative  Skin: Negative  Neurological: Negative  Hematological: Negative            Past Medical History  Past Medical History:   Diagnosis Date    Cancer ([address] )     PROSTATE RESOLVED    Chronic kidney disease     L MIDPOLE RENAL MASS    Diabetes mellitus (HCC)     Diverticulitis     Elevated cholesterol     Gallstones     History of colon polyps     Hypertension     Stomach pain        Past Social History  Past Surgical History:   Procedure Laterality Date    MS COLONOSCOPY FLX DX W/COLLJ SPEC WHEN PFRMD N/A 8/19/2016    Procedure: COLONOSCOPY;  Surgeon: Louise Adams MD; Location:  GI LAB; Service: Colorectal    WISDOM TOOTH EXTRACTION         Past Family History  Family History   Problem Relation Age of Onset    No Known Problems Father     No Known Problems Mother        Past Social history  Social History     Socioeconomic History    Marital status: /Civil Union     Spouse name: Not on file    Number of children: Not on file    Years of education: Not on file    Highest education level: Not on file   Occupational History    Not on file   Social Needs    Financial resource strain: Not on file    Food insecurity:     Worry: Not on file     Inability: Not on file    Transportation needs:     Medical: Not on file     Non-medical: Not on file   Tobacco Use    Smoking status: Former Smoker    Smokeless tobacco: Never Used   Substance and Sexual Activity    Alcohol use: Yes     Comment: Social    Drug use: No    Sexual activity: Not on file   Lifestyle    Physical activity:     Days per week: Not on file     Minutes per session: Not on file    Stress: Not on file   Relationships    Social connections:     Talks on phone: Not on file     Gets together: Not on file     Attends [employer] service: Not on file     Active member of club or organization: Not on file     Attends meetings of clubs or organizations: Not on file     Relationship status: Not on file    Intimate partner violence:     Fear of current or ex partner: Not on file     Emotionally abused: Not on file     Physically abused: Not on file     Forced sexual activity: Not on file   Other Topics Concern    Not on file   Social History Narrative    Not on file     Social History     Tobacco Use   Smoking Status Former Smoker   Smokeless Tobacco Never Used       Current Medications  Current Outpatient Medications   Medication Sig Dispense Refill    ACCU-CHEK FASTCLIX LANCETS MISC   1    ACCU-CHEK GUIDE test strip   1    aspirin 81 MG tablet Take 81 mg by mouth daily        calcium carbonate (OS-HAM) 600 MG tablet Take 600 mg by mouth 2 (two) times a week       Cholecalciferol (VITAMIN D-3 PO) Take 1,000 Units by mouth every other day       glipiZIDE-metFORMIN (METAGLIP) 2 5-250 MG per tablet Take 1 tablet by mouth daily   ramipril (ALTACE) 2 5 mg capsule Take 2 5 mg by mouth daily   rosuvastatin (CRESTOR) 10 MG tablet Take 10 mg by mouth 3 (three) times a week       terazosin (HYTRIN) 2 mg capsule Take 2 mg by mouth daily at bedtime  No current facility-administered medications for this visit  Allergies  Allergies   Allergen Reactions    Aluminum-Containing Compounds Other (See Comments)     Lips Swelling    Penicillins        Past Medical History, Social History, Family History, medications and allergies were reviewed  Vitals  Vitals:    09/20/19 0929   BP: 138/82   BP Location: Left arm   Patient Position: Sitting   Cuff Size: Adult   Pulse: 72   Weight: 69 4 kg (153 lb)   Height: 5' 10" (1 778 m)       Physical Exam  Physical Exam   Constitutional: He is oriented to person, place, and time  He appears well-developed and well-nourished  Cardiovascular: Normal rate  Pulmonary/Chest: Effort normal    Abdominal: Soft  Genitourinary:   Genitourinary Comments: Tiny needle stick scab left flank  No ecchymosis or erythema  Musculoskeletal: Normal range of motion  Neurological: He is alert and oriented to person, place, and time  Skin: Skin is warm, dry and intact  Psychiatric: He has a normal mood and affect  Vitals reviewed          Results  Lab Results   Component Value Date    PSA 3 7 09/04/2019    PSA 3 6 05/06/2019     Lab Results   Component Value Date    CALCIUM 8 9 09/09/2019    K 4 2 09/09/2019    CO2 27 09/09/2019     (H) 09/09/2019    BUN 21 09/09/2019    CREATININE 0 96 09/09/2019     Lab Results   Component Value Date    WBC 4 30 (L) 09/09/2019    HGB 15 5 09/09/2019    HCT 46 5 09/09/2019    MCV 95 09/09/2019     09/09/2019

## 2020-01-06 LAB
CREAT ?TM UR-SCNC: 149 UMOL/L
EXT MICROALBUMIN URINE RANDOM: 7.5
HBA1C MFR BLD HPLC: 7 %
MICROALBUMIN/CREAT UR: 50 MG/G{CREAT}

## 2020-01-08 ENCOUNTER — HOSPITAL ENCOUNTER (OUTPATIENT)
Dept: RADIOLOGY | Facility: HOSPITAL | Age: 80
Discharge: HOME | End: 2020-01-08
Payer: MEDICARE

## 2020-01-08 ENCOUNTER — TRANSCRIBE ORDERS (OUTPATIENT)
Dept: RADIOLOGY | Facility: HOSPITAL | Age: 80
End: 2020-01-08

## 2020-01-08 DIAGNOSIS — C64.2 MALIGNANT NEOPLASM OF LEFT KIDNEY (HCC): ICD-10-CM

## 2020-01-08 PROCEDURE — 74178 CT ABD&PLV WO CNTR FLWD CNTR: CPT

## 2020-01-08 RX ADMIN — IODIXANOL 100 ML: 320 INJECTION, SOLUTION INTRAVASCULAR at 11:46

## 2020-01-14 ENCOUNTER — OFFICE VISIT (OUTPATIENT)
Dept: UROLOGY | Facility: AMBULATORY SURGERY CENTER | Age: 80
End: 2020-01-14
Payer: MEDICARE

## 2020-01-14 VITALS
WEIGHT: 155 LBS | SYSTOLIC BLOOD PRESSURE: 120 MMHG | BODY MASS INDEX: 22.19 KG/M2 | HEIGHT: 70 IN | DIASTOLIC BLOOD PRESSURE: 82 MMHG | HEART RATE: 57 BPM

## 2020-01-14 DIAGNOSIS — C64.2 MALIGNANT NEOPLASM OF LEFT KIDNEY (HCC): Primary | ICD-10-CM

## 2020-01-14 PROCEDURE — 99213 OFFICE O/P EST LOW 20 MIN: CPT | Performed by: UROLOGY

## 2020-01-14 NOTE — PROGRESS NOTES
1/14/2020    Jas Santos  1940  [id number]        Assessment  Renal cell carcinoma status post percutaneous cryoablation left renal tumor 9/9/2019  Surveillance Sergio 6 prostate cancer    Plan  Patient reassured that CT scan reveals adequate treatment of his renal cell carcinoma  He is somewhat anxious however able to be reassured with the help of his wife  Will follow up for total of 5 years periodically  Will start with CT scan in 6 months  Once stability is confirmed by about 2 years, will go annually for CT  Will continue to follow annual PSA for his [place] 6 prostate cancer surveillance  History of Present Illness  Cristian Dasilva is a 70 y o  male returns today status post percutaneous cryoablation of left renal mass into [facility] 2019  Pathology revealed renal cell carcinoma  He has no current complaints  He is also followed for surveillance [place] 6 prostate cancer  Last PSA was 3 7  We are checking annually  Recent CT scan reveals nonenhancing area of previous renal mass  AUA SYMPTOM SCORE      Most Recent Value   AUA SYMPTOM SCORE   How often have you had a sensation of not emptying your bladder completely after you finished urinating? 1   How often have you had to urinate again less than two hours after you finished urinating? 1   How often have you found you stopped and started again several times when you urinate?  0   How often have you found it difficult to postpone urination? 1   How often have you had a weak urinary stream?  0   How often have you had to push or strain to begin urination? 0   How many times did you most typically get up to urinate from the time you went to bed at night until the time you got up in the morning?   1   Quality of Life: If you were to spend the rest of your life with your urinary condition just the way it is now, how would you feel about that?  3   AUA SYMPTOM SCORE  4          Review of Systems  Review of Systems Constitutional: Negative  HENT: Negative  Respiratory: Negative  Cardiovascular: Negative  Gastrointestinal: Negative  Genitourinary:        As per HPI   Musculoskeletal: Negative  Skin: Negative  Neurological: Negative  Hematological: Negative  Past Medical History  Past Medical History:   Diagnosis Date    Cancer ([address] )     PROSTATE RESOLVED    Chronic kidney disease     L MIDPOLE RENAL MASS    Diabetes mellitus (HCC)     Diverticulitis     Elevated cholesterol     Gallstones     History of colon polyps     Hypertension     Stomach pain        Past Social History  Past Surgical History:   Procedure Laterality Date    PA COLONOSCOPY FLX DX W/COLLJ SPEC WHEN PFRMD N/A 8/19/2016    Procedure: COLONOSCOPY;  Surgeon: Wm Bojorquez MD;  Location: [facility];   Service: Colorectal    WISDOM TOOTH EXTRACTION         Past Family History  Family History   Problem Relation Age of Onset    No Known Problems Father     No Known Problems Mother        Past Social history  Social History     Socioeconomic History    Marital status: /Civil Union     Spouse name: Not on file    Number of children: Not on file    Years of education: Not on file    Highest education level: Not on file   Occupational History    Not on file   Social Needs    Financial resource strain: Not on file    Food insecurity:     Worry: Not on file     Inability: Not on file    Transportation needs:     Medical: Not on file     Non-medical: Not on file   Tobacco Use    Smoking status: Former Smoker    Smokeless tobacco: Never Used   Substance and Sexual Activity    Alcohol use: Yes     Comment: Social    Drug use: No    Sexual activity: Not on file   Lifestyle    Physical activity:     Days per week: Not on file     Minutes per session: Not on file    Stress: Not on file   Relationships    Social connections:     Talks on phone: Not on file     Gets together: Not on file     Attends Mu-ism service: Not on file     Active member of club or organization: Not on file     Attends meetings of clubs or organizations: Not on file     Relationship status: Not on file    Intimate partner violence:     Fear of current or ex partner: Not on file     Emotionally abused: Not on file     Physically abused: Not on file     Forced sexual activity: Not on file   Other Topics Concern    Not on file   Social History Narrative    Not on file     Social History     Tobacco Use   Smoking Status Former Smoker   Smokeless Tobacco Never Used       Current Medications  Current Outpatient Medications   Medication Sig Dispense Refill    ACCU-CHEK FASTCLIX LANCETS MISC   1    ACCU-CHEK GUIDE test strip   1    aspirin 81 MG tablet Take 81 mg by mouth daily   calcium carbonate (OS-HAM) 600 MG tablet Take 600 mg by mouth 2 (two) times a week       Cholecalciferol (VITAMIN D-3 PO) Take 1,000 Units by mouth every other day       glipiZIDE-metFORMIN (METAGLIP) 2 5-250 MG per tablet Take 1 tablet by mouth daily   ramipril (ALTACE) 2 5 mg capsule Take 2 5 mg by mouth daily   rosuvastatin (CRESTOR) 10 MG tablet Take 10 mg by mouth 3 (three) times a week       terazosin (HYTRIN) 2 mg capsule Take 2 mg by mouth daily at bedtime  No current facility-administered medications for this visit  Allergies  Allergies   Allergen Reactions    Aluminum-Containing Compounds Other (See Comments)     Lips Swelling    Penicillins        Past Medical History, Social History, Family History, medications and allergies were reviewed  Vitals  Vitals:    01/14/20 1116   BP: 120/82   BP Location: Left arm   Patient Position: Sitting   Cuff Size: Adult   Pulse: 57   Weight: 70 3 kg (155 lb)   Height: 5' 10" (1 778 m)       Physical Exam  Physical Exam   Constitutional: He is oriented to person, place, and time  He appears well-developed and well-nourished  Cardiovascular: Normal rate     Pulmonary/Chest: Effort normal    Abdominal: Soft  Musculoskeletal: Normal range of motion  Neurological: He is alert and oriented to person, place, and time  Skin: Skin is warm, dry and intact  Psychiatric: He has a normal mood and affect  Vitals reviewed          Results  Lab Results   Component Value Date    PSA 3 7 09/04/2019    PSA 3 6 05/06/2019     Lab Results   Component Value Date    CALCIUM 8 9 09/09/2019    K 4 2 09/09/2019    CO2 27 09/09/2019     (H) 09/09/2019    BUN 21 09/09/2019    CREATININE 0 96 09/09/2019     Lab Results   Component Value Date    WBC 4 30 (L) 09/09/2019    HGB 15 5 09/09/2019    HCT 46 5 09/09/2019    MCV 95 09/09/2019     09/09/2019

## 2020-07-09 LAB
BUN SERPL-MCNC: 20 MG/DL (ref 7–25)
BUN/CREAT SERPL: ABNORMAL (CALC) (ref 6–22)
CALCIUM SERPL-MCNC: 9.4 MG/DL (ref 8.6–10.3)
CHLORIDE SERPL-SCNC: 107 MMOL/L (ref 98–110)
CO2 SERPL-SCNC: 26 MMOL/L (ref 20–32)
CREAT SERPL-MCNC: 1.05 MG/DL (ref 0.7–1.18)
GLUCOSE SERPL-MCNC: 133 MG/DL (ref 65–99)
POTASSIUM SERPL-SCNC: 4.2 MMOL/L (ref 3.5–5.3)
SL AMB EGFR AFRICAN AMERICAN: 79 ML/MIN/1.73M2
SL AMB EGFR NON AFRICAN AMERICAN: 68 ML/MIN/1.73M2
SODIUM SERPL-SCNC: 141 MMOL/L (ref 135–146)

## 2020-07-14 ENCOUNTER — HOSPITAL ENCOUNTER (OUTPATIENT)
Dept: RADIOLOGY | Facility: HOSPITAL | Age: 80
Discharge: HOME | End: 2020-07-14
Attending: UROLOGY
Payer: MEDICARE

## 2020-07-14 ENCOUNTER — TRANSCRIBE ORDERS (OUTPATIENT)
Dept: RADIOLOGY | Facility: HOSPITAL | Age: 80
End: 2020-07-14

## 2020-07-14 DIAGNOSIS — C64.2 MALIGNANT NEOPLASM OF LEFT KIDNEY (HCC): ICD-10-CM

## 2020-07-14 PROCEDURE — 74178 CT ABD&PLV WO CNTR FLWD CNTR: CPT

## 2020-07-14 RX ADMIN — IOHEXOL 100 ML: 350 INJECTION, SOLUTION INTRAVENOUS at 11:13

## 2020-07-22 ENCOUNTER — OFFICE VISIT (OUTPATIENT)
Dept: UROLOGY | Facility: AMBULATORY SURGERY CENTER | Age: 80
End: 2020-07-22
Payer: MEDICARE

## 2020-07-22 VITALS
SYSTOLIC BLOOD PRESSURE: 130 MMHG | WEIGHT: 154 LBS | TEMPERATURE: 98 F | HEART RATE: 70 BPM | BODY MASS INDEX: 22.05 KG/M2 | DIASTOLIC BLOOD PRESSURE: 70 MMHG | HEIGHT: 70 IN

## 2020-07-22 DIAGNOSIS — C61 PROSTATE CANCER (HCC): ICD-10-CM

## 2020-07-22 DIAGNOSIS — C64.2 MALIGNANT NEOPLASM OF LEFT KIDNEY (HCC): Primary | ICD-10-CM

## 2020-07-22 PROCEDURE — 99213 OFFICE O/P EST LOW 20 MIN: CPT | Performed by: UROLOGY

## 2021-02-12 DIAGNOSIS — Z23 ENCOUNTER FOR IMMUNIZATION: ICD-10-CM

## 2021-06-21 ENCOUNTER — TELEPHONE (OUTPATIENT)
Dept: UROLOGY | Facility: MEDICAL CENTER | Age: 81
End: 2021-06-21

## 2021-06-21 NOTE — TELEPHONE ENCOUNTER
Patient wife called in regards to getting BMP order printed and left at  for  for today  Office notified and will have ready at  for patient

## 2021-06-22 LAB
BUN SERPL-MCNC: 30 MG/DL (ref 7–25)
BUN/CREAT SERPL: 28 (CALC) (ref 6–22)
CALCIUM SERPL-MCNC: 9.3 MG/DL (ref 8.6–10.3)
CHLORIDE SERPL-SCNC: 108 MMOL/L (ref 98–110)
CO2 SERPL-SCNC: 26 MMOL/L (ref 20–32)
CREAT SERPL-MCNC: 1.06 MG/DL (ref 0.7–1.18)
GLUCOSE SERPL-MCNC: 150 MG/DL (ref 65–99)
POTASSIUM SERPL-SCNC: 4.4 MMOL/L (ref 3.5–5.3)
PSA SERPL-MCNC: 3.7 NG/ML
SL AMB EGFR AFRICAN AMERICAN: 78 ML/MIN/1.73M2
SL AMB EGFR NON AFRICAN AMERICAN: 67 ML/MIN/1.73M2
SODIUM SERPL-SCNC: 142 MMOL/L (ref 135–146)

## 2021-06-28 ENCOUNTER — HOSPITAL ENCOUNTER (OUTPATIENT)
Dept: RADIOLOGY | Facility: HOSPITAL | Age: 81
Discharge: HOME | End: 2021-06-28
Attending: UROLOGY
Payer: MEDICARE

## 2021-06-28 DIAGNOSIS — C64.2 MALIGNANT NEOPLASM OF LEFT KIDNEY (HCC): ICD-10-CM

## 2021-06-28 PROCEDURE — 74178 CT ABD&PLV WO CNTR FLWD CNTR: CPT

## 2021-06-28 RX ADMIN — IODIXANOL 100 ML: 320 INJECTION, SOLUTION INTRAVASCULAR at 11:21

## 2021-07-06 ENCOUNTER — TELEPHONE (OUTPATIENT)
Dept: UROLOGY | Facility: MEDICAL CENTER | Age: 81
End: 2021-07-06

## 2021-07-06 NOTE — TELEPHONE ENCOUNTER
Pt managed by Dr Mario Wade,pt's wife called to check appointment date/time I informed her office 7/13 7:30am AP she states this is to early I informed her schedules were booking end of Aug into Sept she's asking does he need to come into office or can do over phone if it's to just go over results?

## 2021-08-24 ENCOUNTER — ESTABLISHED COMPREHENSIVE EXAM (OUTPATIENT)
Dept: URBAN - METROPOLITAN AREA CLINIC 6 | Facility: CLINIC | Age: 81
End: 2021-08-24

## 2021-08-24 DIAGNOSIS — E11.9: ICD-10-CM

## 2021-08-24 DIAGNOSIS — H25.013: ICD-10-CM

## 2021-08-24 PROCEDURE — 92004 COMPRE OPH EXAM NEW PT 1/>: CPT

## 2021-08-24 ASSESSMENT — VISUAL ACUITY
OS_GLARE: 20/100
OD_GLARE: 20/50-1
OU_CC: J2+1
OU_OTHER: @16""
OD_CC: 20/30
OS_CC: 20/40-2

## 2021-08-24 ASSESSMENT — TONOMETRY
OD_IOP_MMHG: 18
OS_IOP_MMHG: 20

## 2021-10-05 ENCOUNTER — OFFICE VISIT (OUTPATIENT)
Dept: UROLOGY | Facility: AMBULATORY SURGERY CENTER | Age: 81
End: 2021-10-05
Payer: MEDICARE

## 2021-10-05 VITALS
WEIGHT: 154 LBS | HEIGHT: 70 IN | SYSTOLIC BLOOD PRESSURE: 130 MMHG | HEART RATE: 64 BPM | BODY MASS INDEX: 22.05 KG/M2 | DIASTOLIC BLOOD PRESSURE: 76 MMHG

## 2021-10-05 DIAGNOSIS — C61 PROSTATE CANCER (HCC): ICD-10-CM

## 2021-10-05 DIAGNOSIS — N28.89 RENAL MASS: Primary | ICD-10-CM

## 2021-10-05 PROCEDURE — 99213 OFFICE O/P EST LOW 20 MIN: CPT | Performed by: NURSE PRACTITIONER

## 2021-10-05 RX ORDER — TERAZOSIN 2 MG/1
2 CAPSULE ORAL
COMMUNITY
Start: 2021-10-01

## 2021-10-05 RX ORDER — ROSUVASTATIN CALCIUM 10 MG/1
10 TABLET, COATED ORAL DAILY
COMMUNITY
Start: 2021-05-29

## 2022-08-15 ENCOUNTER — HOSPITAL ENCOUNTER (OUTPATIENT)
Dept: RADIOLOGY | Age: 82
Discharge: HOME | End: 2022-08-15
Payer: MEDICARE

## 2022-08-15 DIAGNOSIS — M81.0 AGE-RELATED OSTEOPOROSIS WITHOUT CURRENT PATHOLOGICAL FRACTURE: ICD-10-CM

## 2022-08-15 PROCEDURE — 77080 DXA BONE DENSITY AXIAL: CPT

## 2024-04-18 ENCOUNTER — ESTABLISHED COMPREHENSIVE EXAM (OUTPATIENT)
Dept: URBAN - METROPOLITAN AREA CLINIC 6 | Facility: CLINIC | Age: 84
End: 2024-04-18

## 2024-04-18 DIAGNOSIS — H35.363: ICD-10-CM

## 2024-04-18 DIAGNOSIS — H25.013: ICD-10-CM

## 2024-04-18 DIAGNOSIS — E11.9: ICD-10-CM

## 2024-04-18 DIAGNOSIS — H43.813: ICD-10-CM

## 2024-04-18 PROCEDURE — 92134 CPTRZ OPH DX IMG PST SGM RTA: CPT

## 2024-04-18 PROCEDURE — 92014 COMPRE OPH EXAM EST PT 1/>: CPT

## 2024-04-18 ASSESSMENT — TONOMETRY
OD_IOP_MMHG: 10
OS_IOP_MMHG: 13

## 2024-04-18 ASSESSMENT — VISUAL ACUITY
OD_CC: 20/40-1
OS_CC: 20/400
OU_CC: J3
OS_PH: 20/60-1

## 2024-05-14 ENCOUNTER — ESTABLISHED COMPREHENSIVE EXAM (OUTPATIENT)
Dept: URBAN - METROPOLITAN AREA CLINIC 6 | Facility: CLINIC | Age: 84
End: 2024-05-14

## 2024-05-14 DIAGNOSIS — H43.813: ICD-10-CM

## 2024-05-14 DIAGNOSIS — H25.813: ICD-10-CM

## 2024-05-14 PROCEDURE — 92014 COMPRE OPH EXAM EST PT 1/>: CPT

## 2024-05-14 PROCEDURE — 92136 OPHTHALMIC BIOMETRY: CPT | Mod: 26,LT

## 2024-05-14 ASSESSMENT — VISUAL ACUITY
OS_CC: 20/200
OD_CC: 20/40
OS_PH: 20/100

## 2024-05-14 ASSESSMENT — KERATOMETRY
OD_K1POWER_DIOPTERS: 42.75
OD_AXISANGLE_DEGREES: 90
OS_K2POWER_DIOPTERS: 44.75
OS_AXISANGLE2_DEGREES: 172
OS_K1POWER_DIOPTERS: 42.50
OD_AXISANGLE2_DEGREES: 180
OS_AXISANGLE_DEGREES: 82
OD_K2POWER_DIOPTERS: 44.75

## 2024-05-14 ASSESSMENT — TONOMETRY
OD_IOP_MMHG: 16
OS_IOP_MMHG: 15

## 2024-06-10 ENCOUNTER — SURGERY/PROCEDURE (OUTPATIENT)
Dept: URBAN - METROPOLITAN AREA SURGICAL CENTER 6 | Facility: SURGICAL CENTER | Age: 84
End: 2024-06-10

## 2024-06-10 DIAGNOSIS — H25.812: ICD-10-CM

## 2024-06-10 PROCEDURE — 68841 INSJ RX ELUT IMPLT LAC CANAL: CPT

## 2024-06-10 PROCEDURE — 66984 XCAPSL CTRC RMVL W/O ECP: CPT

## 2024-06-11 ENCOUNTER — 1 DAY POST-OP (OUTPATIENT)
Dept: URBAN - METROPOLITAN AREA CLINIC 6 | Facility: CLINIC | Age: 84
End: 2024-06-11

## 2024-06-11 DIAGNOSIS — Z96.1: ICD-10-CM

## 2024-06-11 DIAGNOSIS — H25.811: ICD-10-CM

## 2024-06-11 PROCEDURE — 92136 OPHTHALMIC BIOMETRY: CPT | Mod: 26,RT

## 2024-06-11 PROCEDURE — 99024 POSTOP FOLLOW-UP VISIT: CPT

## 2024-06-11 ASSESSMENT — KERATOMETRY
OS_AXISANGLE_DEGREES: 82
OS_AXISANGLE_DEGREES: 82
OD_K1POWER_DIOPTERS: 42.75
OS_K2POWER_DIOPTERS: 44.75
OD_AXISANGLE_DEGREES: 90
OD_AXISANGLE_DEGREES: 90
OD_K2POWER_DIOPTERS: 44.75
OS_K2POWER_DIOPTERS: 44.75
OD_AXISANGLE2_DEGREES: 180
OS_AXISANGLE2_DEGREES: 172
OD_AXISANGLE2_DEGREES: 180
OS_K1POWER_DIOPTERS: 42.50
OS_K1POWER_DIOPTERS: 42.50
OD_K2POWER_DIOPTERS: 44.75
OS_AXISANGLE2_DEGREES: 172
OD_K1POWER_DIOPTERS: 42.75

## 2024-06-11 ASSESSMENT — TONOMETRY
OS_IOP_MMHG: 22
OD_IOP_MMHG: 17

## 2024-06-11 ASSESSMENT — VISUAL ACUITY
OS_SC: 20/70
OS_PH: 20/50+1

## 2024-06-19 ENCOUNTER — 1 WEEK POST-OP (OUTPATIENT)
Dept: URBAN - METROPOLITAN AREA CLINIC 6 | Facility: CLINIC | Age: 84
End: 2024-06-19

## 2024-06-19 DIAGNOSIS — Z96.1: ICD-10-CM

## 2024-06-19 DIAGNOSIS — H25.811: ICD-10-CM

## 2024-06-19 PROCEDURE — 99024 POSTOP FOLLOW-UP VISIT: CPT

## 2024-06-19 ASSESSMENT — KERATOMETRY
OS_K1POWER_DIOPTERS: 42.50
OD_K1POWER_DIOPTERS: 42.75
OS_AXISANGLE_DEGREES: 82
OS_AXISANGLE2_DEGREES: 172
OS_K2POWER_DIOPTERS: 44.75
OD_K2POWER_DIOPTERS: 44.75
OD_AXISANGLE2_DEGREES: 180
OD_AXISANGLE_DEGREES: 90

## 2024-06-19 ASSESSMENT — TONOMETRY
OD_IOP_MMHG: 12
OS_IOP_MMHG: 10

## 2024-06-19 ASSESSMENT — VISUAL ACUITY
OD_PH: 20/50-1
OD_SC: 20/70-1
OS_SC: J10
OS_SC: 20/70-1
OD_SC: J10
OS_PH: 20/60

## 2024-07-01 ENCOUNTER — SURGERY/PROCEDURE (OUTPATIENT)
Dept: URBAN - METROPOLITAN AREA SURGICAL CENTER 6 | Facility: SURGICAL CENTER | Age: 84
End: 2024-07-01

## 2024-07-01 DIAGNOSIS — H25.811: ICD-10-CM

## 2024-07-01 PROCEDURE — 66984 XCAPSL CTRC RMVL W/O ECP: CPT | Mod: 79,RT

## 2024-07-01 PROCEDURE — 68841 INSJ RX ELUT IMPLT LAC CANAL: CPT | Mod: 79,RT

## 2024-07-01 ASSESSMENT — KERATOMETRY
OS_K2POWER_DIOPTERS: 44.75
OD_K1POWER_DIOPTERS: 42.75
OD_AXISANGLE_DEGREES: 90
OD_AXISANGLE2_DEGREES: 180
OS_AXISANGLE2_DEGREES: 172
OS_AXISANGLE_DEGREES: 82
OD_K2POWER_DIOPTERS: 44.75
OS_K1POWER_DIOPTERS: 42.50

## 2024-07-02 ENCOUNTER — 1 DAY POST-OP (OUTPATIENT)
Dept: URBAN - METROPOLITAN AREA CLINIC 6 | Facility: CLINIC | Age: 84
End: 2024-07-02

## 2024-07-02 DIAGNOSIS — Z96.1: ICD-10-CM

## 2024-07-02 PROCEDURE — 99024 POSTOP FOLLOW-UP VISIT: CPT

## 2024-07-02 ASSESSMENT — VISUAL ACUITY
OD_PH: 20/30
OS_SC: 20/50-2
OD_SC: 20/50-2
OS_PH: 20/30

## 2024-07-02 ASSESSMENT — TONOMETRY
OS_IOP_MMHG: 10
OD_IOP_MMHG: 16

## 2024-07-02 ASSESSMENT — KERATOMETRY
OS_K2POWER_DIOPTERS: 44.75
OD_K1POWER_DIOPTERS: 42.75
OD_K2POWER_DIOPTERS: 44.75
OS_K1POWER_DIOPTERS: 42.50
OS_AXISANGLE_DEGREES: 82
OD_AXISANGLE2_DEGREES: 180
OD_AXISANGLE_DEGREES: 90
OS_AXISANGLE2_DEGREES: 172

## 2024-07-10 ENCOUNTER — 1 WEEK POST-OP (OUTPATIENT)
Dept: URBAN - METROPOLITAN AREA CLINIC 6 | Facility: CLINIC | Age: 84
End: 2024-07-10

## 2024-07-10 DIAGNOSIS — Z96.1: ICD-10-CM

## 2024-07-10 PROCEDURE — 99024 POSTOP FOLLOW-UP VISIT: CPT

## 2024-07-10 ASSESSMENT — KERATOMETRY
OD_K1POWER_DIOPTERS: 42.75
OS_K2POWER_DIOPTERS: 44.75
OD_AXISANGLE2_DEGREES: 180
OS_AXISANGLE_DEGREES: 82
OD_K2POWER_DIOPTERS: 44.75
OS_K1POWER_DIOPTERS: 42.50
OS_AXISANGLE2_DEGREES: 172
OD_AXISANGLE_DEGREES: 90

## 2024-07-10 ASSESSMENT — VISUAL ACUITY
OD_SC: 20/100+1
OD_PH: 20/40
OS_PH: 20/40-2
OS_SC: 20/50-2

## 2024-07-10 ASSESSMENT — TONOMETRY
OS_IOP_MMHG: 10
OD_IOP_MMHG: 9

## 2024-08-21 ENCOUNTER — POST-OP CHECK (OUTPATIENT)
Dept: URBAN - METROPOLITAN AREA CLINIC 6 | Facility: CLINIC | Age: 84
End: 2024-08-21

## 2024-08-21 DIAGNOSIS — H52.223: ICD-10-CM

## 2024-08-21 DIAGNOSIS — Z96.1: ICD-10-CM

## 2024-08-21 PROCEDURE — 99024 POSTOP FOLLOW-UP VISIT: CPT

## 2024-08-21 PROCEDURE — 92015 DETERMINE REFRACTIVE STATE: CPT

## 2024-08-21 ASSESSMENT — VISUAL ACUITY
OS_PH: 20/50
OD_SC: 20/100
OD_PH: 20/40
OS_SC: 20/200

## 2024-08-21 ASSESSMENT — KERATOMETRY
OD_AXISANGLE2_DEGREES: 180
OS_AXISANGLE2_DEGREES: 172
OD_K1POWER_DIOPTERS: 42.75
OD_K2POWER_DIOPTERS: 44.75
OS_K1POWER_DIOPTERS: 42.50
OS_K2POWER_DIOPTERS: 44.75
OD_AXISANGLE_DEGREES: 90
OS_AXISANGLE_DEGREES: 82

## 2024-08-21 ASSESSMENT — TONOMETRY
OS_IOP_MMHG: 8
OD_IOP_MMHG: 11

## 2025-01-16 ENCOUNTER — TELEPHONE (OUTPATIENT)
Age: 85
End: 2025-01-16

## 2025-01-16 NOTE — TELEPHONE ENCOUNTER
Patient's wife, Elsa, called to verify the patient's appointment on 1/22/2025. I confirmed information.

## 2025-01-22 ENCOUNTER — OFFICE VISIT (OUTPATIENT)
Dept: UROLOGY | Facility: AMBULATORY SURGERY CENTER | Age: 85
End: 2025-01-22

## 2025-01-22 VITALS
BODY MASS INDEX: 20.9 KG/M2 | HEIGHT: 70 IN | SYSTOLIC BLOOD PRESSURE: 110 MMHG | OXYGEN SATURATION: 98 % | WEIGHT: 146 LBS | DIASTOLIC BLOOD PRESSURE: 70 MMHG | HEART RATE: 80 BPM

## 2025-01-22 DIAGNOSIS — Z85.528 HISTORY OF KIDNEY CANCER: Primary | ICD-10-CM

## 2025-01-22 DIAGNOSIS — N39.43 BENIGN PROSTATIC HYPERPLASIA WITH POST-VOID DRIBBLING: ICD-10-CM

## 2025-01-22 DIAGNOSIS — N40.1 BENIGN PROSTATIC HYPERPLASIA WITH POST-VOID DRIBBLING: ICD-10-CM

## 2025-01-22 DIAGNOSIS — E11.9 TYPE 2 DIABETES MELLITUS WITHOUT COMPLICATION, WITHOUT LONG-TERM CURRENT USE OF INSULIN (HCC): ICD-10-CM

## 2025-01-22 DIAGNOSIS — C61 PROSTATE CANCER (HCC): ICD-10-CM

## 2025-01-22 LAB

## 2025-01-22 RX ORDER — TERAZOSIN 2 MG/1
2 CAPSULE ORAL DAILY
Qty: 30 CAPSULE | Refills: 3 | Status: SHIPPED | OUTPATIENT
Start: 2025-01-22

## 2025-01-22 NOTE — ASSESSMENT & PLAN NOTE
Patient currently controlled on terazosin 2 mg daily, but reports the new onset of postvoid dribbling as well as nocturia x 2-3  We discussed transitioning from the terazosin to finasteride due to the patient's complaints of orthostatic hypotension.  We discussed that finasteride is used to shrink the prostate and we will avoid any orthostatic hypotensive effects.  However, the patient defers discontinuing his medication until he discusses with his PCP.  Patient will continue on terazosin 2 mg daily at this time in our office will continue to monitor for worsening/progression of lower urinary tract symptoms

## 2025-01-22 NOTE — ASSESSMENT & PLAN NOTE
History of [place] 3+3 equal 6 prostate cancer on active surveillance  Patient's last PSA was performed 6/22/2021 and found to be 3.7.  We discussed that the patient should be undergoing annual PSA testing for continued surveillance.  Patient will undergo PSA testing within the next 1 to 2 weeks and our office will call with results.    Lab Results   Component Value Date    PSA 3.7 06/22/2021    PSA 3.7 09/04/2019    PSA 3.6 05/06/2019

## 2025-01-22 NOTE — PROGRESS NOTES
1/22/2025      Assessment and Plan    84 y.o. male new patient to [facility] urology    Prostate cancer (HCC)  History of [place] 3+3 equal 6 prostate cancer on active surveillance  Patient's last PSA was performed 6/22/2021 and found to be 3.7.  We discussed that the patient should be undergoing annual PSA testing for continued surveillance.  Patient will undergo PSA testing within the next 1 to 2 weeks and our office will call with results.    Lab Results   Component Value Date    PSA 3.7 06/22/2021    PSA 3.7 09/04/2019    PSA 3.6 05/06/2019        History of kidney cancer  History of left renal mass status post percutaneous cryotherapy formed in September 2019.  Final pathology confirmed renal cell carcinoma.  Patient underwent routine monitoring with CT scans for the next 2 years which fortunately revealed no evidence of recurrence.  We discussed that the patient should undergo an ultrasound of the kidney and bladder to evaluate for any potential recurrence.  Additionally, we discussed that if there are any concerning features on ultrasound then we may need to obtain a CT scan.  Patient will undergo an ultrasound the kidney and bladder in our office will call with results    Benign prostatic hyperplasia with post-void dribbling  Patient currently controlled on terazosin 2 mg daily, but reports the new onset of postvoid dribbling as well as nocturia x 2-3  We discussed transitioning from the terazosin to finasteride due to the patient's complaints of orthostatic hypotension.  We discussed that finasteride is used to shrink the prostate and we will avoid any orthostatic hypotensive effects.  However, the patient defers discontinuing his medication until he discusses with his PCP.  Patient will continue on terazosin 2 mg daily at this time in our office will continue to monitor for worsening/progression of lower urinary tract symptoms        History of Present Illness  Feliz Sun is a 84 y.o. male  here for evaluation of renal cell carcinoma status post percutaneous cryoablation in the left tumor performed 9/9/2019 and [place] 3+3 equal 6 prostate cancer on active surveillance.  Patient has a history of left renal mass status post percutaneous cryotherapy performed in September 2019.  Pathology confirmed renal cell carcinoma.  Patient underwent routine imaging over the next 2 years which showed no evidence of disease recurrence or metastasis.  Additionally, patient's history includes small volume [place] 6 prostate cancer identified in 1 core on prostate biopsy in 2013.  Patient's last upper tract imaging was a CT abdomen pelvis with and without contrast performed 6/28/2021 which noted no evidence of residual or recurrent disease.  Patient's last PSA was performed 6/22/2021 and found to be 3.7.  Patient's lower urinary tract symptoms are currently controlled on terazosin 2 mg daily.  Today, the patient reports that he has noticed nocturia x 2-3 as well as postvoid dribbling after finishing his urination.  Patient is most bothered by his postvoid dribbling as he will experience an episode of urine running down his leg and wetting his pants.  Furthermore, the patient notes episodes of orthostatic hypotension with dizziness and lightheadedness.  Patient denies ever falling due to these episodes.  Otherwise, the patient offers no other lower urinary tract complaints today's office visit.        Review of Systems   Constitutional:  Negative for chills and fever.   HENT:  Negative for ear pain and sore throat.    Eyes:  Negative for pain and visual disturbance.   Respiratory:  Negative for cough and shortness of breath.    Cardiovascular:  Negative for chest pain and palpitations.   Gastrointestinal:  Negative for abdominal pain and vomiting.   Genitourinary:  Positive for frequency (Nocturia x 2-3). Negative for decreased urine volume, difficulty urinating, dysuria, flank pain, hematuria and urgency.  "  Musculoskeletal:  Negative for arthralgias and back pain.   Skin:  Negative for color change and rash.   Neurological:  Negative for seizures and syncope.   All other systems reviewed and are negative.               Vitals  Vitals:    01/22/25 1554   BP: 110/70   BP Location: Left arm   Patient Position: Sitting   Cuff Size: Standard   Pulse: 80   SpO2: 98%   Weight: 66.2 kg (146 lb)   Height: 5' 10\" (1.778 m)       Physical Exam  Vitals reviewed.   Constitutional:       General: He is not in acute distress.     Appearance: Normal appearance. He is not ill-appearing.   HENT:      Head: Normocephalic and atraumatic.      Nose: Nose normal.   Eyes:      General: No scleral icterus.  Pulmonary:      Effort: No respiratory distress.   Abdominal:      General: Abdomen is flat. There is no distension.      Palpations: Abdomen is soft.      Tenderness: There is no abdominal tenderness.   Musculoskeletal:         General: Normal range of motion.      Cervical back: Normal range of motion.   Skin:     General: Skin is warm.      Coloration: Skin is not jaundiced.   Neurological:      Mental Status: He is alert and oriented to person, place, and time.      Gait: Gait normal.   Psychiatric:         Mood and Affect: Mood normal.         Behavior: Behavior normal.           Past History  Past Medical History:   Diagnosis Date    Cancer (HCC)     PROSTATE RESOLVED    Chronic kidney disease     L MIDPOLE RENAL MASS    Diabetes mellitus (HCC)     Diverticulitis     Elevated cholesterol     Gallstones     History of colon polyps     Hypertension     Stomach pain      Social History     Socioeconomic History    Marital status: /Civil Union     Spouse name: None    Number of children: None    Years of education: None    Highest education level: None   Occupational History    None   Tobacco Use    Smoking status: Former    Smokeless tobacco: Never   Substance and Sexual Activity    Alcohol use: Yes     Comment: Social    Drug " use: No    Sexual activity: None   Other Topics Concern    None   Social History Narrative    None     Social Drivers of Health     Financial Resource Strain: Not on file   Food Insecurity: No Food Insecurity (4/3/2024)    Received from [facility]    Hunger Vital Sign     Worried About Running Out of Food in the Last Year: Never true     Ran Out of Food in the Last Year: Never true   Transportation Needs: No Transportation Needs (4/3/2024)    Received from [facility]    PRAPARE - Transportation     Lack of Transportation (Medical): No     Lack of Transportation (Non-Medical): No   Physical Activity: Not on file   Stress: Not on file   Social Connections: Feeling Socially Integrated (4/3/2024)    Received from [facility]    OASIS : Social Isolation     How often do you feel lonely or isolated from those around you?: Never   Intimate Partner Violence: Not At Risk (4/3/2024)    Received from [facility]    Humiliation, Afraid, Rape, and Kick questionnaire     Fear of Current or Ex-Partner: No     Emotionally Abused: No     Physically Abused: No     Sexually Abused: No   Housing Stability: Low Risk  (4/3/2024)    Received from [facility]    Housing Stability Vital Sign     Unable to Pay for Housing in the Last Year: No     Number of Places Lived in the Last Year: 1     Unstable Housing in the Last Year: No     Social History     Tobacco Use   Smoking Status Former   Smokeless Tobacco Never     Family History   Problem Relation Age of Onset    No Known Problems Father     No Known Problems Mother        The following portions of the patient's history were reviewed and updated as appropriate: allergies, current medications, past medical history, past social history, past surgical history and problem list.    Results  Recent Results (from the past hour)   POCT urine dip    Collection Time: 01/22/25  3:56 PM   Result Value Ref  Range    LEUKOCYTE ESTERASE,UA neg     NITRITE,UA neg     SL AMB POCT UROBILINOGEN 0.2     POCT URINE PROTEIN neg      PH,UA 5.0     BLOOD,UA neg     SPECIFIC GRAVITY,UA 1.020     KETONES,UA neg     BILIRUBIN,UA neg     GLUCOSE, UA neg      COLOR,UA yellow     CLARITY,UA clear    POCT Measure PVR    Collection Time: 01/22/25  3:56 PM   Result Value Ref Range    POST-VOID RESIDUAL VOLUME, ML POC 30 mL   ]  Lab Results   Component Value Date    PSA 3.7 06/22/2021    PSA 3.7 09/04/2019    PSA 3.6 05/06/2019     Lab Results   Component Value Date    CALCIUM 9.4 04/03/2024    K 4.3 04/03/2024    CO2 28 04/03/2024     04/03/2024    BUN 20 04/03/2024    CREATININE 0.96 04/03/2024     Lab Results   Component Value Date    WBC 4.30 (L) 09/09/2019    HGB 15.5 09/09/2019    HCT 46.5 09/09/2019    MCV 95 09/09/2019     09/09/2019

## 2025-01-22 NOTE — PATIENT INSTRUCTIONS
Medication -   Hytrin - medication to relax the prostate and make it easier to pee. However, can also affect our blood pressure and cause dizziness     Finasteride - Medication that shrinks the prostate down and does not affect our blood pressure.

## 2025-01-22 NOTE — ASSESSMENT & PLAN NOTE
History of left renal mass status post percutaneous cryotherapy formed in September 2019.  Final pathology confirmed renal cell carcinoma.  Patient underwent routine monitoring with CT scans for the next 2 years which fortunately revealed no evidence of recurrence.  We discussed that the patient should undergo an ultrasound of the kidney and bladder to evaluate for any potential recurrence.  Additionally, we discussed that if there are any concerning features on ultrasound then we may need to obtain a CT scan.  Patient will undergo an ultrasound the kidney and bladder in our office will call with results

## 2025-02-28 ENCOUNTER — TELEPHONE (OUTPATIENT)
Age: 85
End: 2025-02-28

## 2025-03-06 ENCOUNTER — RESULTS FOLLOW-UP (OUTPATIENT)
Dept: UROLOGY | Facility: MEDICAL CENTER | Age: 85
End: 2025-03-06

## 2025-03-06 DIAGNOSIS — C61 PROSTATE CANCER (HCC): Primary | ICD-10-CM

## 2025-03-06 LAB — PSA SERPL-MCNC: 5.21 NG/ML

## 2025-03-06 NOTE — TELEPHONE ENCOUNTER
Spoke to both the patient and his wife, Elsa.  Patients PSA returned elevated at 5.2 and Bon is recommending repeating the PSA prior to patients 6/18/25 appt.    Patient then stated he is going to [facility] to get his US of kidney and bladder done tomorrow and wanted to know if he should keep that appt.  I stated yes.  Elsa requested I mail the PSA order because patient uses [facility] for labwork.  PSA order printed and put in outgoing mail.

## 2025-03-10 ENCOUNTER — RESULTS FOLLOW-UP (OUTPATIENT)
Dept: UROLOGY | Facility: AMBULATORY SURGERY CENTER | Age: 85
End: 2025-03-10

## 2025-03-10 NOTE — TELEPHONE ENCOUNTER
I reviewed the patient's most recent ultrasound of the kidney and bladder.  Ultrasound noted no mass, lesion, or stones bilaterally within the kidneys.  No findings concerning for recurrence of kidney cancer.  No further imaging required at this time.

## 2025-03-10 NOTE — TELEPHONE ENCOUNTER
I was able to call and speak with patient to let him know that we reviewed the patient's most recent ultrasound of the kidney and bladder. Ultrasound noted no mass, lesion, or stones bilaterally within the kidneys. No findings concerning for recurrence of kidney cancer. No further imaging required at this time. Patient understood everything and is aware of his results.

## 2025-04-01 ENCOUNTER — TELEPHONE (OUTPATIENT)
Dept: UROLOGY | Facility: AMBULATORY SURGERY CENTER | Age: 85
End: 2025-04-01

## 2025-04-01 NOTE — TELEPHONE ENCOUNTER
I reviewed the patient's last office visit and was discussed that he would stay on the terazosin as he is not interested in making any medication adjustments.

## 2025-04-01 NOTE — TELEPHONE ENCOUNTER
Patients spouse called Rx refill line asking if patient should stay on Hytrin or change to Finasteride. She stated she would just  the last refill until patient speaks to Bon Oneil PA-C.  Please contact Elsa at [phone]

## 2025-04-11 NOTE — TELEPHONE ENCOUNTER
Patient's wife called in, patient had been on Terazosin for a while now but most recently when we prescribed it (PCP did previously) the instructions were changed from take at bedtime to take in the morning. She wants to confirm what time of day he should be taking. As well as should be 90 day not 30 moving forward.

## 2025-04-25 NOTE — PROGRESS NOTES
7/22/2020    Lorrie Turcios  1940  [id number]        Assessment  1  Renal cell carcinoma status post percutaneous cryoablation left renal tumor 9/9/2019  2  Surveillance [place] 6 prostate cancer    Plan  Patient wife were reassured that his cryoablation appears adequate for treatment  We will continue to follow-up with CT scan every 6 months for total of 2 years and then annually  He will also be due for PSA and we will continue to follow his [place] 6 prostate cancer  All questions answered and they agree with the plan  History of Present Illness  Radha Blackwell is a 70 y o  male status post percutaneous cryotherapy for left renal mass in September of 2019  Pathology did confirm renal cell carcinoma  He is also followed for surveillance for [place] 6 prostate cancer  PSA has been checked annually  Last PSA was 3 7  He has no current complaints  Recent CT scan images reviewed with the patient and his wife  This indicates non enhancement and some shrinkage of the previously identified renal cell carcinoma, consistent with cryoablation  Review of Systems  Review of Systems   Constitutional: Negative  HENT: Negative  Respiratory: Negative  Cardiovascular: Negative  Gastrointestinal: Negative  Genitourinary:        As per HPI   Musculoskeletal: Negative  Skin: Negative  Neurological: Negative  Hematological: Negative  Past Medical History  Past Medical History:   Diagnosis Date    Cancer ([address] )     PROSTATE RESOLVED    Chronic kidney disease     L [place] RENAL MASS    Diabetes mellitus (HCC)     Diverticulitis     Elevated cholesterol     Gallstones     History of colon polyps     Hypertension     Stomach pain        Past Social History  Past Surgical History:   Procedure Laterality Date    HI COLONOSCOPY FLX DX W/COLLJ SPEC WHEN PFRMD N/A 8/19/2016    Procedure: COLONOSCOPY;  Surgeon: Tommy Huston MD;  Location: [facility];   Service: Colorectal    WISDOM TOOTH EXTRACTION         Past Family History  Family History   Problem Relation Age of Onset    No Known Problems Father     No Known Problems Mother        Past Social history  Social History     Socioeconomic History    Marital status: /Civil Union     Spouse name: Not on file    Number of children: Not on file    Years of education: Not on file    Highest education level: Not on file   Occupational History    Not on file   Social Needs    Financial resource strain: Not on file    Food insecurity:     Worry: Not on file     Inability: Not on file    Transportation needs:     Medical: Not on file     Non-medical: Not on file   Tobacco Use    Smoking status: Former Smoker    Smokeless tobacco: Never Used   Substance and Sexual Activity    Alcohol use: Yes     Comment: Social    Drug use: No    Sexual activity: Not on file   Lifestyle    Physical activity:     Days per week: Not on file     Minutes per session: Not on file    Stress: Not on file   Relationships    Social connections:     Talks on phone: Not on file     Gets together: Not on file     Attends Rastafari service: Not on file     Active member of club or organization: Not on file     Attends meetings of clubs or organizations: Not on file     Relationship status: Not on file    Intimate partner violence:     Fear of current or ex partner: Not on file     Emotionally abused: Not on file     Physically abused: Not on file     Forced sexual activity: Not on file   Other Topics Concern    Not on file   Social History Narrative    Not on file     Social History     Tobacco Use   Smoking Status Former Smoker   Smokeless Tobacco Never Used       Current Medications  Current Outpatient Medications   Medication Sig Dispense Refill    ACCU-CHEK FASTCLIX LANCETS MISC   1    ACCU-CHEK GUIDE test strip   1    aspirin 81 MG tablet Take 81 mg by mouth daily        calcium carbonate (OS-HAM) 600 MG tablet Take 600 mg by mouth 2 (two) times a week       Cholecalciferol (VITAMIN D-3 PO) Take 1,000 Units by mouth every other day       glipiZIDE-metFORMIN (METAGLIP) 2 5-250 MG per tablet Take 1 tablet by mouth daily   ramipril (ALTACE) 2 5 mg capsule Take 2 5 mg by mouth daily   rosuvastatin (CRESTOR) 10 MG tablet Take 10 mg by mouth 3 (three) times a week       terazosin (HYTRIN) 2 mg capsule Take 2 mg by mouth daily at bedtime  No current facility-administered medications for this visit  Allergies  Allergies   Allergen Reactions    Aluminum-Containing Compounds Other (See Comments)     Lips Swelling       Past Medical History, Social History, Family History, medications and allergies were reviewed  Vitals  Vitals:    07/22/20 1255   BP: 130/70   BP Location: Left arm   Patient Position: Sitting   Cuff Size: Adult   Pulse: 70   Temp: 98 °F (36 7 °C)   TempSrc: Temporal   Weight: 69 9 kg (154 lb)   Height: 5' 10" (1 778 m)       Physical Exam  Physical Exam   Constitutional: He is oriented to person, place, and time  He appears well-developed and well-nourished  HENT:   Head: Normocephalic and atraumatic  Eyes: Conjunctivae are normal    Cardiovascular: Normal rate  Pulmonary/Chest: Effort normal    Abdominal: Soft  Neurological: He is alert and oriented to person, place, and time  Skin: Skin is warm, dry and intact  Psychiatric: He has a normal mood and affect  Vitals reviewed          Results  Lab Results   Component Value Date    PSA 3 7 09/04/2019    PSA 3 6 05/06/2019     Lab Results   Component Value Date    CALCIUM 9 4 07/09/2020    K 4 2 07/09/2020    CO2 26 07/09/2020     07/09/2020    BUN 20 07/09/2020    CREATININE 1 05 07/09/2020     Lab Results   Component Value Date    WBC 4 30 (L) 09/09/2019    HGB 15 5 09/09/2019    HCT 46 5 09/09/2019    MCV 95 09/09/2019     09/09/2019 Yes